# Patient Record
Sex: FEMALE | Race: OTHER | HISPANIC OR LATINO | Employment: FULL TIME | URBAN - METROPOLITAN AREA
[De-identification: names, ages, dates, MRNs, and addresses within clinical notes are randomized per-mention and may not be internally consistent; named-entity substitution may affect disease eponyms.]

---

## 2017-01-18 ENCOUNTER — APPOINTMENT (EMERGENCY)
Dept: ULTRASOUND IMAGING | Facility: HOSPITAL | Age: 22
End: 2017-01-18
Payer: COMMERCIAL

## 2017-01-18 ENCOUNTER — HOSPITAL ENCOUNTER (EMERGENCY)
Facility: HOSPITAL | Age: 22
Discharge: HOME | End: 2017-01-19
Attending: EMERGENCY MEDICINE | Admitting: EMERGENCY MEDICINE
Payer: COMMERCIAL

## 2017-01-18 VITALS
WEIGHT: 130 LBS | OXYGEN SATURATION: 100 % | DIASTOLIC BLOOD PRESSURE: 70 MMHG | SYSTOLIC BLOOD PRESSURE: 103 MMHG | TEMPERATURE: 98.4 F | RESPIRATION RATE: 16 BRPM | HEART RATE: 82 BPM

## 2017-01-18 DIAGNOSIS — O03.9 MISCARRIAGE: ICD-10-CM

## 2017-01-18 DIAGNOSIS — O20.9 FIRST TRIMESTER BLEEDING: Primary | ICD-10-CM

## 2017-01-18 DIAGNOSIS — N93.9 VAGINAL BLEEDING, ABNORMAL: ICD-10-CM

## 2017-01-18 LAB
ABO GROUP BLD: NORMAL
ALBUMIN SERPL BCP-MCNC: 3.8 G/DL (ref 3.5–5)
ALP SERPL-CCNC: 44 U/L (ref 46–116)
ALT SERPL W P-5'-P-CCNC: 18 U/L (ref 12–78)
ANION GAP SERPL CALCULATED.3IONS-SCNC: 5 MMOL/L (ref 4–13)
AST SERPL W P-5'-P-CCNC: 17 U/L (ref 5–45)
B-HCG SERPL-ACNC: ABNORMAL MIU/ML
BACTERIA UR QL AUTO: ABNORMAL /HPF
BASOPHILS # BLD AUTO: 0.01 THOUSANDS/ΜL (ref 0–0.1)
BASOPHILS NFR BLD AUTO: 0 % (ref 0–1)
BILIRUB SERPL-MCNC: 0.43 MG/DL (ref 0.2–1)
BILIRUB UR QL STRIP: ABNORMAL
BUN SERPL-MCNC: 9 MG/DL (ref 5–25)
CALCIUM SERPL-MCNC: 9 MG/DL (ref 8.3–10.1)
CHLORIDE SERPL-SCNC: 104 MMOL/L (ref 100–108)
CLARITY UR: CLEAR
CO2 SERPL-SCNC: 29 MMOL/L (ref 21–32)
COLOR UR: YELLOW
CREAT SERPL-MCNC: 0.59 MG/DL (ref 0.6–1.3)
EOSINOPHIL # BLD AUTO: 0.08 THOUSAND/ΜL (ref 0–0.61)
EOSINOPHIL NFR BLD AUTO: 1 % (ref 0–6)
ERYTHROCYTE [DISTWIDTH] IN BLOOD BY AUTOMATED COUNT: 12.2 % (ref 11.6–15.1)
GFR SERPL CREATININE-BSD FRML MDRD: >60 ML/MIN/1.73SQ M
GLUCOSE SERPL-MCNC: 89 MG/DL (ref 65–140)
GLUCOSE UR STRIP-MCNC: NEGATIVE MG/DL
HCG UR QL: POSITIVE
HCT VFR BLD AUTO: 39 % (ref 34.8–46.1)
HGB BLD-MCNC: 13.2 G/DL (ref 11.5–15.4)
HGB UR QL STRIP.AUTO: NEGATIVE
KETONES UR STRIP-MCNC: ABNORMAL MG/DL
LEUKOCYTE ESTERASE UR QL STRIP: ABNORMAL
LYMPHOCYTES # BLD AUTO: 2.2 THOUSANDS/ΜL (ref 0.6–4.47)
LYMPHOCYTES NFR BLD AUTO: 38 % (ref 14–44)
MCH RBC QN AUTO: 31.7 PG (ref 26.8–34.3)
MCHC RBC AUTO-ENTMCNC: 33.8 G/DL (ref 31.4–37.4)
MCV RBC AUTO: 94 FL (ref 82–98)
MONOCYTES # BLD AUTO: 0.47 THOUSAND/ΜL (ref 0.17–1.22)
MONOCYTES NFR BLD AUTO: 8 % (ref 4–12)
MUCOUS THREADS UR QL AUTO: ABNORMAL
NEUTROPHILS # BLD AUTO: 3.1 THOUSANDS/ΜL (ref 1.85–7.62)
NEUTS SEG NFR BLD AUTO: 53 % (ref 43–75)
NITRITE UR QL STRIP: NEGATIVE
NON-SQ EPI CELLS URNS QL MICRO: ABNORMAL /HPF
NRBC BLD AUTO-RTO: 0 /100 WBCS
OTHER STN SPEC: ABNORMAL
PH UR STRIP.AUTO: 6 [PH] (ref 4.5–8)
PLATELET # BLD AUTO: 236 THOUSANDS/UL (ref 149–390)
PMV BLD AUTO: 10.4 FL (ref 8.9–12.7)
POTASSIUM SERPL-SCNC: 3.9 MMOL/L (ref 3.5–5.3)
PROT SERPL-MCNC: 7.2 G/DL (ref 6.4–8.2)
PROT UR STRIP-MCNC: NEGATIVE MG/DL
RBC # BLD AUTO: 4.17 MILLION/UL (ref 3.81–5.12)
RBC #/AREA URNS AUTO: ABNORMAL /HPF
RH BLD: NEGATIVE
SODIUM SERPL-SCNC: 138 MMOL/L (ref 136–145)
SP GR UR STRIP.AUTO: >=1.03 (ref 1–1.03)
UROBILINOGEN UR QL STRIP.AUTO: 2 E.U./DL
WBC # BLD AUTO: 5.86 THOUSAND/UL (ref 4.31–10.16)
WBC #/AREA URNS AUTO: ABNORMAL /HPF

## 2017-01-18 PROCEDURE — 85025 COMPLETE CBC W/AUTO DIFF WBC: CPT | Performed by: EMERGENCY MEDICINE

## 2017-01-18 PROCEDURE — 84702 CHORIONIC GONADOTROPIN TEST: CPT | Performed by: EMERGENCY MEDICINE

## 2017-01-18 PROCEDURE — 87086 URINE CULTURE/COLONY COUNT: CPT

## 2017-01-18 PROCEDURE — 81002 URINALYSIS NONAUTO W/O SCOPE: CPT | Performed by: EMERGENCY MEDICINE

## 2017-01-18 PROCEDURE — 86901 BLOOD TYPING SEROLOGIC RH(D): CPT | Performed by: EMERGENCY MEDICINE

## 2017-01-18 PROCEDURE — 81025 URINE PREGNANCY TEST: CPT | Performed by: EMERGENCY MEDICINE

## 2017-01-18 PROCEDURE — 76815 OB US LIMITED FETUS(S): CPT

## 2017-01-18 PROCEDURE — 81001 URINALYSIS AUTO W/SCOPE: CPT

## 2017-01-18 PROCEDURE — 36415 COLL VENOUS BLD VENIPUNCTURE: CPT | Performed by: EMERGENCY MEDICINE

## 2017-01-18 PROCEDURE — 86900 BLOOD TYPING SEROLOGIC ABO: CPT | Performed by: EMERGENCY MEDICINE

## 2017-01-18 PROCEDURE — 80053 COMPREHEN METABOLIC PANEL: CPT | Performed by: EMERGENCY MEDICINE

## 2017-01-18 RX ORDER — ACETAMINOPHEN 325 MG/1
650 TABLET ORAL EVERY 6 HOURS PRN
Qty: 30 TABLET | Refills: 0 | Status: SHIPPED | OUTPATIENT
Start: 2017-01-18 | End: 2017-02-17

## 2017-01-19 PROCEDURE — 99284 EMERGENCY DEPT VISIT MOD MDM: CPT

## 2017-01-19 PROCEDURE — 96372 THER/PROPH/DIAG INJ SC/IM: CPT

## 2017-01-19 RX ADMIN — HUMAN RHO(D) IMMUNE GLOBULIN 300 MCG: 300 INJECTION, SOLUTION INTRAMUSCULAR at 00:13

## 2017-01-20 LAB — BACTERIA UR CULT: NORMAL

## 2017-12-19 ENCOUNTER — HOSPITAL ENCOUNTER (EMERGENCY)
Facility: HOSPITAL | Age: 22
Discharge: HOME | End: 2017-12-19
Attending: EMERGENCY MEDICINE | Admitting: EMERGENCY MEDICINE
Payer: COMMERCIAL

## 2017-12-19 VITALS
WEIGHT: 152 LBS | DIASTOLIC BLOOD PRESSURE: 60 MMHG | SYSTOLIC BLOOD PRESSURE: 107 MMHG | HEART RATE: 106 BPM | RESPIRATION RATE: 18 BRPM | OXYGEN SATURATION: 95 % | TEMPERATURE: 98.7 F

## 2017-12-19 DIAGNOSIS — R11.2 NAUSEA, VOMITING, AND DIARRHEA: ICD-10-CM

## 2017-12-19 DIAGNOSIS — R19.7 NAUSEA, VOMITING, AND DIARRHEA: ICD-10-CM

## 2017-12-19 DIAGNOSIS — R10.13 ACUTE EPIGASTRIC PAIN: Primary | ICD-10-CM

## 2017-12-19 LAB
AMORPH URATE CRY URNS QL MICRO: ABNORMAL /HPF
ANION GAP BLD CALC-SCNC: 18 MMOL/L (ref 4–13)
BACTERIA UR QL AUTO: ABNORMAL /HPF
BILIRUB UR QL STRIP: ABNORMAL
BUN BLD-MCNC: 14 MG/DL (ref 5–25)
CA-I BLD-SCNC: 1.13 MMOL/L (ref 1.12–1.32)
CHLORIDE BLD-SCNC: 103 MMOL/L (ref 100–108)
CLARITY UR: ABNORMAL
COLOR UR: YELLOW
CREAT BLD-MCNC: 0.7 MG/DL (ref 0.6–1.3)
EXT PREG TEST URINE: NEGATIVE
GFR SERPL CREATININE-BSD FRML MDRD: 122 ML/MIN/1.73SQ M
GLUCOSE SERPL-MCNC: 106 MG/DL (ref 65–140)
GLUCOSE UR STRIP-MCNC: NEGATIVE MG/DL
HCT VFR BLD CALC: 45 % (ref 34.8–46.1)
HGB BLDA-MCNC: 15.3 G/DL (ref 11.5–15.4)
HGB UR QL STRIP.AUTO: ABNORMAL
KETONES UR STRIP-MCNC: ABNORMAL MG/DL
LEUKOCYTE ESTERASE UR QL STRIP: ABNORMAL
NITRITE UR QL STRIP: NEGATIVE
NON-SQ EPI CELLS URNS QL MICRO: ABNORMAL /HPF
PCO2 BLD: 23 MMOL/L (ref 21–32)
PH UR STRIP.AUTO: 5 [PH] (ref 4.5–8)
POTASSIUM BLD-SCNC: 3.7 MMOL/L (ref 3.5–5.3)
PROT UR STRIP-MCNC: ABNORMAL MG/DL
RBC #/AREA URNS AUTO: ABNORMAL /HPF
SODIUM BLD-SCNC: 140 MMOL/L (ref 136–145)
SP GR UR STRIP.AUTO: >=1.03 (ref 1–1.03)
SPECIMEN SOURCE: ABNORMAL
UROBILINOGEN UR QL STRIP.AUTO: 1 E.U./DL
WBC #/AREA URNS AUTO: ABNORMAL /HPF

## 2017-12-19 PROCEDURE — 96375 TX/PRO/DX INJ NEW DRUG ADDON: CPT

## 2017-12-19 PROCEDURE — 81002 URINALYSIS NONAUTO W/O SCOPE: CPT | Performed by: EMERGENCY MEDICINE

## 2017-12-19 PROCEDURE — 81001 URINALYSIS AUTO W/SCOPE: CPT

## 2017-12-19 PROCEDURE — 81025 URINE PREGNANCY TEST: CPT | Performed by: EMERGENCY MEDICINE

## 2017-12-19 PROCEDURE — 96361 HYDRATE IV INFUSION ADD-ON: CPT

## 2017-12-19 PROCEDURE — 80047 BASIC METABLC PNL IONIZED CA: CPT

## 2017-12-19 PROCEDURE — 96374 THER/PROPH/DIAG INJ IV PUSH: CPT

## 2017-12-19 PROCEDURE — 99284 EMERGENCY DEPT VISIT MOD MDM: CPT

## 2017-12-19 PROCEDURE — 85014 HEMATOCRIT: CPT

## 2017-12-19 RX ORDER — ONDANSETRON 2 MG/ML
4 INJECTION INTRAMUSCULAR; INTRAVENOUS ONCE
Status: COMPLETED | OUTPATIENT
Start: 2017-12-19 | End: 2017-12-19

## 2017-12-19 RX ORDER — ONDANSETRON 4 MG/1
4 TABLET, FILM COATED ORAL EVERY 6 HOURS
Qty: 6 TABLET | Refills: 0 | Status: ON HOLD | OUTPATIENT
Start: 2017-12-19 | End: 2018-08-06

## 2017-12-19 RX ORDER — DICYCLOMINE HCL 20 MG
20 TABLET ORAL 2 TIMES DAILY
Qty: 8 TABLET | Refills: 0 | Status: ON HOLD | OUTPATIENT
Start: 2017-12-19 | End: 2018-08-06

## 2017-12-19 RX ORDER — KETOROLAC TROMETHAMINE 30 MG/ML
15 INJECTION, SOLUTION INTRAMUSCULAR; INTRAVENOUS ONCE
Status: COMPLETED | OUTPATIENT
Start: 2017-12-19 | End: 2017-12-19

## 2017-12-19 RX ORDER — MAGNESIUM HYDROXIDE/ALUMINUM HYDROXICE/SIMETHICONE 120; 1200; 1200 MG/30ML; MG/30ML; MG/30ML
30 SUSPENSION ORAL ONCE
Status: COMPLETED | OUTPATIENT
Start: 2017-12-19 | End: 2017-12-19

## 2017-12-19 RX ADMIN — ALUMINUM HYDROXIDE, MAGNESIUM HYDROXIDE, AND SIMETHICONE 30 ML: 200; 200; 20 SUSPENSION ORAL at 22:33

## 2017-12-19 RX ADMIN — ONDANSETRON 4 MG: 2 INJECTION INTRAMUSCULAR; INTRAVENOUS at 22:32

## 2017-12-19 RX ADMIN — LIDOCAINE HYDROCHLORIDE 15 ML: 20 SOLUTION ORAL; TOPICAL at 22:33

## 2017-12-19 RX ADMIN — KETOROLAC TROMETHAMINE 15 MG: 30 INJECTION, SOLUTION INTRAMUSCULAR at 22:33

## 2017-12-19 RX ADMIN — SODIUM CHLORIDE 1000 ML: 0.9 INJECTION, SOLUTION INTRAVENOUS at 22:31

## 2017-12-20 NOTE — DISCHARGE INSTRUCTIONS
Gastroenteritis   LO QUE NECESITA SABER:   La gastroenteritis, o gripe estomacal, es felix infección del estómago y los intestinos  INSTRUCCIONES SOBRE [facility]:   Llame al 911 en zachariah de presentar lo siguiente:   · Usted tiene dificultad para respirar o pulso acelerado  Regrese a la linda de emergencias si:   · Usted ve dawna en perez diarrea  · Usted no puede dejar de vomitar  · Usted no ha orinado en 12 horas  · Usted siente que se va a desmayar  Pregúntele a perez [place] vitaminas y minerales son adecuados para usted  · Usted tiene fiebre  · Usted continúa con vómitos o diarrea aún después del tratamiento  · Usted ve lombrices en perez diarrea  · Perez boca u ojos están secos  Usted no está orinando tanto o con la misma frecuencia  · Usted tiene preguntas o inquietudes acerca de perez condición o cuidado  Medicamentos:   · Medicamentos,  se pueden administrar para [employer] vómitos o la diarrea, disminuir los calambres abdominales o tratar felix infección  · [place] karmen medicamentos sanna se le haya indicado  Consulte con perez médico si usted nhan que perez medicamento no le está ayudando o si presenta efectos secundarios  Infórmele si es alérgico a cualquier medicamento  Mantenga felix lista actualizada de los [employer], las vitaminas y los productos herbales que thanh  Incluya los siguientes datos de los medicamentos: cantidad, frecuencia y motivo de administración  Traiga con usted la lista o los envases de la píldoras a karmen citas de seguimiento  Lleve la lista de los medicamentos con usted en zachariah de felix emergencia  [place] de perez síntomas:   · [place] líquidos sanna se le haya indicado  Pregunte a perez médico qué cantidad de líquido debe beber a diario y qué líquidos le recomienda  Es posible que también necesite matt felix solución de rehidratación oral (SRO)   Felix SRO contiene la cantidad [place] de azúcar, sal y minerales en agua para reponer los líquidos corporales  · Consuma alimentos blandos  Cuando usted sienta [place], empiece a comer alimentos suaves y blandos  Ejemplos son los plátanos, sopas claras, yi y puré de [place]  No consuma productos lácteos, alcohol, bebidas azucaradas, o bebidas con cafeína hasta que se sienta mejor  · Descanse el mayor tiempo posible  Cuando se empiece a sentir mejor, comience poco a poco a hacer más cada día  Evite la propagación de la gastroenteritis:  La gastroenteritis se puede propagar fácilmente  Manténgase usted, perez demetrio y karmen alrededores limpios para ayudar a evitar la propagación de la gastroenteritis:  · Lávese las pooja frecuentemente  Utilice agua y [employer] las pooja después de usar el baño, cambiarle el pañal a un lev o estornudar  Lávese las pooja antes de comer o preparar alimentos  · Limpie las superficies y lave la ropa con frecuencia  Lave perez ropa y karmen toallas por separado del krishna de la ropa  Limpie las superficies de perez hogar con limpiador antibacterial o con blanqueador  · Lave y cocine giacomo los alimentos  Lave las verduras crudas antes de cocinar  [address] y [place]  No utilice los mismos platos para las belkis crudas que para otros alimentos  Ponga en el refrigerador inmediatamente cualquier alimento que haya sobrado  · Esté alerta cuando usted vaya de campamento o cuando viaje  Solamente tome agua limpia  No tome agua de los riky o amita a menos que usted purifique o hierva el agua suhail  Cuando viaje, tome agua embotellada y no le ponga hielo  No coma fruta con la cáscara  No coma pescado crudo o belkis que no están cocinadas completamente  Acuda a karmen consultas de control con perez médico según le indicaron  Anote karmen preguntas para que se acuerde de hacerlas jarred karmen visitas  © 2017 [facility] Information is for End User's use only and may not be sold, redistributed or otherwise used for commercial purposes   All illustrations and images included in CareNotes® are the copyrighted property of A D A M , Inc  or Trevin Ndiaye  Esta información es sólo para uso en educación  Perez intención no es darle un consejo médico sobre enfermedades o tratamientos  Colsulte con perez Rafaela Avila farmacéutico antes de seguir cualquier régimen médico para saber si es seguro y efectivo para usted

## 2017-12-20 NOTE — ED PROVIDER NOTES
History  Chief Complaint   Patient presents with    Abdominal Pain     Pt states middle quad abd pain that started today, vomiting and diarrhea       History provided by:  Patient  Abdominal Pain   Pain location:  Epigastric  Pain quality: burning    Pain radiates to:  Does not radiate  Timing:  Constant  Progression:  Unchanged  Chronicity:  New  Context: not previous surgeries and not sick contacts    Relieved by:  Nothing  Worsened by:  Nothing  Ineffective treatments:  None tried  Associated symptoms: diarrhea, nausea and vomiting    Associated symptoms: no chills, no constipation, no cough, no dysuria, no fever, no hematuria, no vaginal bleeding and no vaginal discharge    Risk factors: has not had multiple surgeries        None       History reviewed  No pertinent past medical history  History reviewed  No pertinent surgical history  History reviewed  No pertinent family history  I have reviewed and agree with the history as documented  Social History   Substance Use Topics    Smoking status: Never Smoker    Smokeless tobacco: Not on file    Alcohol use No        Review of Systems   Constitutional: Negative for appetite change, chills and fever  HENT: Negative for congestion and rhinorrhea  Respiratory: Negative for cough  Gastrointestinal: Positive for abdominal pain, diarrhea, nausea and vomiting  Negative for abdominal distention, blood in stool and constipation  Genitourinary: Negative for dysuria, flank pain, frequency, hematuria, urgency, vaginal bleeding and vaginal discharge  Musculoskeletal: Negative for back pain  All other systems reviewed and are negative        Physical Exam  ED Triage Vitals   Temperature Pulse Respirations Blood Pressure SpO2   12/19/17 2209 12/19/17 2209 12/19/17 2209 12/19/17 2209 12/19/17 2209   98 7 °F (37 1 °C) (!) 135 18 119/64 96 %      Temp Source Heart Rate Source Patient Position - Orthostatic VS BP Location FiO2 (%)   12/19/17 2209 12/19/17 2209 12/19/17 2311 12/19/17 2209 --   Temporal Monitor Lying Right arm       Pain Score       12/19/17 2233       8           Orthostatic Vital Signs  Vitals:    12/19/17 2209 12/19/17 2311   BP: 119/64 107/60   Pulse: (!) 135 (!) 106   Patient Position - Orthostatic VS:  Lying       Physical Exam   Constitutional: She is oriented to person, place, and time  She appears well-developed and well-nourished  No distress  HENT:   Head: Normocephalic  Mouth/Throat: Oropharynx is clear and moist and mucous membranes are normal    Neck: Normal range of motion  Neck supple  Cardiovascular: Regular rhythm, normal heart sounds and intact distal pulses  Tachycardia present  Exam reveals no gallop and no friction rub  No murmur heard  Pulmonary/Chest: Effort normal and breath sounds normal  No respiratory distress  She has no wheezes  She has no rales  Abdominal: Soft  Normal appearance and bowel sounds are normal  She exhibits no distension and no mass  There is no hepatosplenomegaly  There is tenderness in the epigastric area  There is no rigidity, no rebound, no guarding, no CVA tenderness, no tenderness at McBurney's point and negative Burgos's sign  Lymphadenopathy:     She has no cervical adenopathy  Neurological: She is alert and oriented to person, place, and time  Skin: Skin is warm, dry and intact  No rash noted  No pallor  Nursing note and vitals reviewed        ED Medications  Medications   sodium chloride 0 9 % bolus 1,000 mL (0 mL Intravenous Stopped 12/19/17 2335)   ondansetron (ZOFRAN) injection 4 mg (4 mg Intravenous Given 12/19/17 2232)   aluminum-magnesium hydroxide-simethicone (MYLANTA) 200-200-20 mg/5 mL oral suspension 30 mL (30 mL Oral Given 12/19/17 2233)   lidocaine viscous (XYLOCAINE) 2 % mucosal solution 15 mL (15 mL Swish & Swallow Given 12/19/17 2233)   ketorolac (TORADOL) injection 15 mg (15 mg Intravenous Given 12/19/17 2233)       Diagnostic Studies  Results Reviewed Procedure Component Value Units Date/Time    Urine Microscopic [[id number]]  (Abnormal) Collected:  12/19/17 2255    Lab Status:  Final result Specimen:  Urine from Urine, Clean Catch Updated:  12/19/17 2253     RBC, UA 0-1 (A) /hpf      WBC, UA 0-1 (A) /hpf      Epithelial Cells Moderate (A) /hpf      Bacteria, UA Occasional /hpf      AMORPH URATES Innumerable /hpf     POCT Chem 8+ [[id number]]  (Abnormal) Collected:  12/19/17 2238    Lab Status:  Final result Updated:  12/19/17 2243     SODIUM, I-STAT 140 mmol/l      Potassium, i-STAT 3 7 mmol/L      Chloride, istat 103 mmol/L      CO2, i-STAT 23 mmol/L      Anion Gap, Istat 18 (H) mmol/L      Calcium, Ionized i-STAT 1 13 mmol/L      BUN, I-STAT 14 mg/dl      Creatinine, i-STAT 0 7 mg/dl      eGFR 122 ml/min/1 73sq m      Glucose, i-STAT 106 mg/dl      Hct, i-STAT 45 %      Hgb, i-STAT 15 3 g/dl      Specimen Type VENOUS    POCT urinalysis dipstick [[id number]]  (Abnormal) Resulted:  12/19/17 2239    Lab Status:  Final result Specimen:  Urine Updated:  12/19/17 2239    ED Urine Macroscopic [[id number]]  (Abnormal) Collected:  12/19/17 2255    Lab Status:  Final result Specimen:  Urine Updated:  12/19/17 2238     Color, UA Yellow     Clarity, UA Turbid     pH, UA 5 0     Leukocytes, UA Small (A)     Nitrite, UA Negative     Protein, UA Trace (A) mg/dl      Glucose, UA Negative mg/dl      Ketones, UA 15 (1+) (A) mg/dl      Urobilinogen, UA 1 0 E U /dl      Bilirubin, UA Interference- unable to analyze (A)     Blood, UA Trace (A)     Specific Gravity, UA >=1 030    Narrative:       CLINITEK RESULT    POCT pregnancy, urine [[id number]]  (Normal) Resulted:  12/19/17 2232    Lab Status:  Final result Updated:  12/19/17 2232     EXT PREG TEST UR (Ref: Negative) negative                 No orders to display              Procedures  Procedures       Phone Contacts  ED Phone Contact    ED Course  ED Course as of Dec 19 2336   Tue Dec 19, 2017   2305 Pt states she feels better  MDM  Number of Diagnoses or Management Options  Diagnosis management comments: Epigastric pain/N/V/D - Will check istat to r/o lyte abnormality and give symptomatic tx  Amount and/or Complexity of Data Reviewed  Tests in the medicine section of CPT®: ordered and reviewed      CritCare Time    Disposition  Final diagnoses:   Acute epigastric pain   Nausea, vomiting, and diarrhea     Time reflects when diagnosis was documented in both MDM as applicable and the Disposition within this note     Time User Action Codes Description Comment    12/19/2017 11:05 PM Concha Javier [R10 13] Acute epigastric pain     12/19/2017 11:06 PM Israel Javier [employer] [R11 2,  R19 7] Nausea, vomiting, and diarrhea       ED Disposition     ED Disposition Condition Comment    Discharge  [address] discharge to home/self care  Condition at discharge: Good        Follow-up Information    None       Patient's Medications   Discharge Prescriptions    DICYCLOMINE (BENTYL) 20 MG TABLET    Take 1 tablet by mouth 2 (two) times a day       Start Date: 12/19/2017End Date: --       Order Dose: 20 mg       Quantity: 8 tablet    Refills: 0    ONDANSETRON (ZOFRAN) 4 MG TABLET    Take 1 tablet by mouth every 6 (six) hours       Start Date: 12/19/2017End Date: --       Order Dose: 4 mg       Quantity: 6 tablet    Refills: 0     No discharge procedures on file      ED Provider  Electronically Signed by           Delvin Lino 22, DO 12/19/17 2493

## 2018-08-06 ENCOUNTER — HOSPITAL ENCOUNTER (EMERGENCY)
Facility: HOSPITAL | Age: 23
Discharge: HOME | End: 2018-08-06
Admitting: OBSTETRICS & GYNECOLOGY
Payer: COMMERCIAL

## 2018-08-06 VITALS
HEART RATE: 81 BPM | TEMPERATURE: 98.5 F | RESPIRATION RATE: 18 BRPM | SYSTOLIC BLOOD PRESSURE: 112 MMHG | OXYGEN SATURATION: 100 % | DIASTOLIC BLOOD PRESSURE: 62 MMHG

## 2018-08-06 DIAGNOSIS — R10.13 EPIGASTRIC PAIN: Primary | ICD-10-CM

## 2018-08-06 PROBLEM — Z3A.19 19 WEEKS GESTATION OF PREGNANCY: Status: ACTIVE | Noted: 2018-08-06

## 2018-08-06 PROCEDURE — 99201 HB OFFICE/OUTPATIENT VISIT NEW (BRIEF): CPT

## 2018-08-06 RX ORDER — PANTOPRAZOLE SODIUM 40 MG/1
40 TABLET, DELAYED RELEASE ORAL ONCE
Status: COMPLETED | OUTPATIENT
Start: 2018-08-06 | End: 2018-08-06

## 2018-08-06 RX ORDER — MAGNESIUM HYDROXIDE/ALUMINUM HYDROXICE/SIMETHICONE 120; 1200; 1200 MG/30ML; MG/30ML; MG/30ML
30 SUSPENSION ORAL ONCE
Status: COMPLETED | OUTPATIENT
Start: 2018-08-06 | End: 2018-08-06

## 2018-08-06 RX ORDER — RANITIDINE 150 MG/1
150 TABLET ORAL 2 TIMES DAILY
Qty: 30 TABLET | Refills: 0 | Status: SHIPPED | OUTPATIENT
Start: 2018-08-06 | End: 2019-08-21

## 2018-08-06 RX ADMIN — PANTOPRAZOLE SODIUM 40 MG: 40 TABLET, DELAYED RELEASE ORAL at 05:43

## 2018-08-06 RX ADMIN — ALUMINUM HYDROXIDE, MAGNESIUM HYDROXIDE, AND SIMETHICONE 30 ML: 200; 200; 20 SUSPENSION ORAL at 05:38

## 2018-08-06 NOTE — PROGRESS NOTES
Triage Note - OB  Jhony Weller 20 y o  female MRN: [id number]  Unit/Bed#: L&D 324-01 Encounter: [id number]    Chief Complaint: Epigastric pain  DRAKE: Estimated Date of Delivery: 18    HPI: Patient is a  at 19w4d here with complaints of epigastric pain  She reports this pain began around 3:00 a m  and woke up from her sleep  She describes the pain as a burning pain does not radiate  The pain is not associated with fevers, chills, chest pain, shortness of breath, nausea, vomiting or diarrhea  She has tried to take Tums but it did not make her feel any better so she decided to come and get evaluated  Patient reports she ate fried seafood last night  Patient describes a longstanding history of gastritis  Patient denies any symptoms of contractions, lower abdominal pain, vaginal bleeding or loss of fluid  She reports regular fetal movement  She [facility]   She has an obstetric history that is significant for 2 missed ab, the last in 2017 was a complete molar pregnancy as confirmed by pathology for which she underwent a D&C  Vitals:   /62 (BP Location: Right arm)   Pulse 81   Temp 98 5 °F (36 9 °C) (Oral)   Resp 18   LMP 10/28/2017 (Exact Date)   SpO2 100%   There is no height or weight on file to calculate BMI  Physical Exam  GEN:  Appears common in no acute distress  ABD:  Soft, mild tenderness in the epigastric area, no rebound, no guarding, no Burgos sign, gravid nontender uterus palpated at the umbilicus  FHT:  150    TOCO:   Quiet    Lab, Imaging and other studies: I have personally reviewed pertinent reports  A/P:  20year old  with epigastric pain most likely representing gastritis  No signs of acute abdomen her Blima Hargrove sign suggestive of cholelithiasis  Patient with reported improvement following medications    1) Pantoprazole 40 mg p o  once  2) Mylanta  3) Patient requested prescription- Ranitidine 150mg PO BID given for 2 weeks  4) Discharge instructions given to patient and labor precautions reviewed  Importance with follow up to her doctor were discussed      D/W Dr Sterling Moreno MD  8/6/2018  5:31 AM

## 2018-08-06 NOTE — DISCHARGE INSTRUCTIONS
Gastritis   WHAT YOU NEED TO KNOW:   Gastritis is inflammation or irritation of the lining of your stomach  DISCHARGE INSTRUCTIONS:   Call 911 for any of the following:   · You develop chest pain or shortness of breath  Seek care immediately if:   · You vomit blood  · You have black or bloody bowel movements  · You have severe stomach or back pain  Contact your healthcare provider if:   · You have a fever  · You have new or worsening symptoms, even after treatment  · You have questions or concerns about your condition or care  Medicines:   · Medicines  may be given to help treat a bacterial infection or decrease stomach acid  · Take your medicine as directed  Contact your healthcare provider if you think your medicine is not helping or if you have side effects  Tell him or her if you are allergic to any medicine  Keep a list of the medicines, vitamins, and herbs you take  Include the amounts, and when and why you take them  Bring the list or the pill bottles to follow-up visits  Carry your medicine list with you in case of an emergency  Manage or prevent gastritis:   · Do not smoke  Nicotine and other chemicals in cigarettes and cigars can make your symptoms worse and cause lung damage  Ask your healthcare provider for information if you currently smoke and need help to quit  E-cigarettes or smokeless tobacco still contain nicotine  Talk to your healthcare provider before you use these products  · Do not drink alcohol  Alcohol can prevent healing and make your gastritis worse  Talk to your healthcare provider if you need help to stop drinking  · Do not take NSAIDs or aspirin unless directed  These and similar medicines can cause irritation  If your healthcare provider says it is okay to take NSAIDs, take them with food  · Do not eat foods that cause irritation  Foods such as oranges and salsa can cause burning or pain  Eat a variety of healthy foods   Examples include fruits (not citrus), vegetables, low-fat dairy products, beans, whole-grain breads, and lean meats and fish  Try to eat small meals, and drink water with your meals  Do not eat for at least 3 hours before you go to bed  · Find ways to relax and decrease stress  Stress can increase stomach acid and make gastritis worse  Activities such as yoga, meditation, or listening to music can help you relax  Spend time with friends, or do things you enjoy  Follow up with your healthcare provider as directed: You may need ongoing tests or treatment, or referral to a gastroenterologist  Write down your questions so you remember to ask them during your visits  © 2017 [address] Information is for End User's use only and may not be sold, redistributed or otherwise used for commercial purposes  All illustrations and images included in CareNotes® are the copyrighted property of [employer]  or Trevin Ndiaye  The above information is an  only  It is not intended as medical advice for individual conditions or treatments  Talk to your doctor, nurse or pharmacist before following any medical regimen to see if it is safe and effective for you

## 2019-03-05 ENCOUNTER — HOSPITAL ENCOUNTER (EMERGENCY)
Facility: HOSPITAL | Age: 24
Discharge: HOME | End: 2019-03-05
Attending: EMERGENCY MEDICINE | Admitting: EMERGENCY MEDICINE
Payer: COMMERCIAL

## 2019-03-05 VITALS
TEMPERATURE: 98.2 F | HEART RATE: 63 BPM | OXYGEN SATURATION: 99 % | SYSTOLIC BLOOD PRESSURE: 104 MMHG | WEIGHT: 158.73 LBS | RESPIRATION RATE: 12 BRPM | DIASTOLIC BLOOD PRESSURE: 67 MMHG

## 2019-03-05 DIAGNOSIS — R19.7 DIARRHEA: ICD-10-CM

## 2019-03-05 DIAGNOSIS — R11.10 VOMITING: Primary | ICD-10-CM

## 2019-03-05 LAB
ALBUMIN SERPL BCP-MCNC: 3.8 G/DL (ref 3.5–5)
ALP SERPL-CCNC: 77 U/L (ref 46–116)
ALT SERPL W P-5'-P-CCNC: 25 U/L (ref 12–78)
ANION GAP SERPL CALCULATED.3IONS-SCNC: 9 MMOL/L (ref 4–13)
AST SERPL W P-5'-P-CCNC: 21 U/L (ref 5–45)
BACTERIA UR QL AUTO: ABNORMAL /HPF
BASOPHILS # BLD AUTO: 0.02 THOUSANDS/ΜL (ref 0–0.1)
BASOPHILS NFR BLD AUTO: 0 % (ref 0–1)
BILIRUB SERPL-MCNC: 0.34 MG/DL (ref 0.2–1)
BILIRUB UR QL STRIP: NEGATIVE
BUN SERPL-MCNC: 15 MG/DL (ref 5–25)
CALCIUM SERPL-MCNC: 8.5 MG/DL (ref 8.3–10.1)
CHLORIDE SERPL-SCNC: 106 MMOL/L (ref 100–108)
CLARITY UR: CLEAR
CO2 SERPL-SCNC: 23 MMOL/L (ref 21–32)
COLOR UR: YELLOW
CREAT SERPL-MCNC: 0.73 MG/DL (ref 0.6–1.3)
EOSINOPHIL # BLD AUTO: 0.02 THOUSAND/ΜL (ref 0–0.61)
EOSINOPHIL NFR BLD AUTO: 0 % (ref 0–6)
ERYTHROCYTE [DISTWIDTH] IN BLOOD BY AUTOMATED COUNT: 12.5 % (ref 11.6–15.1)
EXT PREG TEST URINE: NORMAL
GFR SERPL CREATININE-BSD FRML MDRD: 114 ML/MIN/1.73SQ M
GLUCOSE SERPL-MCNC: 99 MG/DL (ref 65–140)
GLUCOSE UR STRIP-MCNC: NEGATIVE MG/DL
HCT VFR BLD AUTO: 40.5 % (ref 34.8–46.1)
HGB BLD-MCNC: 12.9 G/DL (ref 11.5–15.4)
HGB UR QL STRIP.AUTO: ABNORMAL
IMM GRANULOCYTES # BLD AUTO: 0.02 THOUSAND/UL (ref 0–0.2)
IMM GRANULOCYTES NFR BLD AUTO: 0 % (ref 0–2)
KETONES UR STRIP-MCNC: NEGATIVE MG/DL
LEUKOCYTE ESTERASE UR QL STRIP: NEGATIVE
LIPASE SERPL-CCNC: 93 U/L (ref 73–393)
LYMPHOCYTES # BLD AUTO: 1.1 THOUSANDS/ΜL (ref 0.6–4.47)
LYMPHOCYTES NFR BLD AUTO: 11 % (ref 14–44)
MCH RBC QN AUTO: 29.9 PG (ref 26.8–34.3)
MCHC RBC AUTO-ENTMCNC: 31.9 G/DL (ref 31.4–37.4)
MCV RBC AUTO: 94 FL (ref 82–98)
MONOCYTES # BLD AUTO: 0.27 THOUSAND/ΜL (ref 0.17–1.22)
MONOCYTES NFR BLD AUTO: 3 % (ref 4–12)
NEUTROPHILS # BLD AUTO: 8.3 THOUSANDS/ΜL (ref 1.85–7.62)
NEUTS SEG NFR BLD AUTO: 86 % (ref 43–75)
NITRITE UR QL STRIP: NEGATIVE
NON-SQ EPI CELLS URNS QL MICRO: ABNORMAL /HPF
NRBC BLD AUTO-RTO: 0 /100 WBCS
PH UR STRIP.AUTO: 5.5 [PH]
PLATELET # BLD AUTO: 275 THOUSANDS/UL (ref 149–390)
PMV BLD AUTO: 9.7 FL (ref 8.9–12.7)
POTASSIUM SERPL-SCNC: 4.1 MMOL/L (ref 3.5–5.3)
PROT SERPL-MCNC: 7.5 G/DL (ref 6.4–8.2)
PROT UR STRIP-MCNC: NEGATIVE MG/DL
RBC # BLD AUTO: 4.31 MILLION/UL (ref 3.81–5.12)
RBC #/AREA URNS AUTO: ABNORMAL /HPF
SODIUM SERPL-SCNC: 138 MMOL/L (ref 136–145)
SP GR UR STRIP.AUTO: >=1.03 (ref 1–1.03)
UROBILINOGEN UR QL STRIP.AUTO: 0.2 E.U./DL
WBC # BLD AUTO: 9.73 THOUSAND/UL (ref 4.31–10.16)
WBC #/AREA URNS AUTO: ABNORMAL /HPF

## 2019-03-05 PROCEDURE — 83690 ASSAY OF LIPASE: CPT | Performed by: PHYSICIAN ASSISTANT

## 2019-03-05 PROCEDURE — 80053 COMPREHEN METABOLIC PANEL: CPT | Performed by: PHYSICIAN ASSISTANT

## 2019-03-05 PROCEDURE — 99284 EMERGENCY DEPT VISIT MOD MDM: CPT

## 2019-03-05 PROCEDURE — 96361 HYDRATE IV INFUSION ADD-ON: CPT

## 2019-03-05 PROCEDURE — 81001 URINALYSIS AUTO W/SCOPE: CPT | Performed by: PHYSICIAN ASSISTANT

## 2019-03-05 PROCEDURE — 36415 COLL VENOUS BLD VENIPUNCTURE: CPT | Performed by: PHYSICIAN ASSISTANT

## 2019-03-05 PROCEDURE — 96374 THER/PROPH/DIAG INJ IV PUSH: CPT

## 2019-03-05 PROCEDURE — 81025 URINE PREGNANCY TEST: CPT | Performed by: PHYSICIAN ASSISTANT

## 2019-03-05 PROCEDURE — 85025 COMPLETE CBC W/AUTO DIFF WBC: CPT | Performed by: PHYSICIAN ASSISTANT

## 2019-03-05 RX ORDER — ONDANSETRON 2 MG/ML
4 INJECTION INTRAMUSCULAR; INTRAVENOUS ONCE
Status: COMPLETED | OUTPATIENT
Start: 2019-03-05 | End: 2019-03-05

## 2019-03-05 RX ORDER — ONDANSETRON 4 MG/1
4 TABLET, FILM COATED ORAL EVERY 8 HOURS PRN
Qty: 10 TABLET | Refills: 0 | Status: SHIPPED | OUTPATIENT
Start: 2019-03-05 | End: 2019-08-21

## 2019-03-05 RX ADMIN — ONDANSETRON 4 MG: 2 INJECTION INTRAMUSCULAR; INTRAVENOUS at 09:19

## 2019-03-05 RX ADMIN — SODIUM CHLORIDE 1000 ML: 0.9 INJECTION, SOLUTION INTRAVENOUS at 09:18

## 2019-03-05 NOTE — ED PROVIDER NOTES
History  Chief Complaint   Patient presents with    Vomiting     vomiting and diarrhea, generalized abd cramping since 0400 no ill contacts    Diarrhea     This is a 24-year-old female patient woke up at 4 o'clock this morning and had some vomiting and diarrhea without blood  She is able to keep down sips of water but she cannot keep down food  She appears nontoxic in no acute distress nothing makes it better food makes it worse  Fever no chills no headache blurred vision double vision cough congestion sore throat   No chest pain or shortness of breath no abdominal pain no urgency frequency or dysuria  Differential diagnosis includes not limited to viral versus bacterial gastroenteritis, pregnancy, food poisoning less likely          Prior to Admission Medications   Prescriptions Last Dose Informant Patient Reported? Taking? Prenatal MV-Min-Fe Fum-FA-DHA (PRENATAL 1 PO) Not Taking at Unknown time Self Yes No   Sig: Take 1 tablet by mouth daily   ranitidine (ZANTAC) 150 mg tablet Not Taking at Unknown time  No No   Sig: Take 1 tablet (150 mg total) by mouth 2 (two) times a day   Patient not taking: Reported on 3/5/2019      Facility-Administered Medications: None       History reviewed  No pertinent past medical history  History reviewed  No pertinent surgical history  History reviewed  No pertinent family history  I have reviewed and agree with the history as documented  Social History     Tobacco Use    Smoking status: Never Smoker    Smokeless tobacco: Never Used   Substance Use Topics    Alcohol use: No    Drug use: No        Review of Systems   All other systems reviewed and are negative  Physical Exam  Physical Exam   Constitutional: She appears well-developed and well-nourished  HENT:   Head: Normocephalic and atraumatic     Right Ear: External ear normal    Left Ear: External ear normal    Nose: Nose normal    Mouth/Throat: Oropharynx is clear and moist    Eyes: Pupils are equal, round, and reactive to light  Conjunctivae are normal    Neck: Normal range of motion  Neck supple  Cardiovascular: Normal rate and regular rhythm  Pulmonary/Chest: Effort normal and breath sounds normal    Abdominal: Soft  Bowel sounds are normal  There is no tenderness  Neurological: She is alert  Skin: Skin is warm  Psychiatric: She has a normal mood and affect  Her behavior is normal    Nursing note and vitals reviewed  Vital Signs  ED Triage Vitals [03/05/19 0847]   Temperature Pulse Respirations Blood Pressure SpO2   98 2 °F (36 8 °C) 84 14 131/73 98 %      Temp Source Heart Rate Source Patient Position - Orthostatic VS BP Location FiO2 (%)   Oral Monitor -- -- --      Pain Score       7           Vitals:    03/05/19 0847 03/05/19 0952   BP: 131/73 104/67   Pulse: 84 63       Visual Acuity      ED Medications  Medications   sodium chloride 0 9 % bolus 1,000 mL (0 mL Intravenous Stopped 3/5/19 0952)   ondansetron (ZOFRAN) injection 4 mg (4 mg Intravenous Given 3/5/19 0919)       Diagnostic Studies  Results Reviewed     Procedure Component Value Units Date/Time    Comprehensive metabolic panel [[id number]] Collected:  03/05/19 0829    Lab Status:  Final result Specimen:  Blood from Arm, Right Updated:  03/05/19 6585     Sodium 138 mmol/L      Potassium 4 1 mmol/L      Chloride 106 mmol/L      CO2 23 mmol/L      ANION GAP 9 mmol/L      BUN 15 mg/dL      Creatinine 0 73 mg/dL      Glucose 99 mg/dL      Calcium 8 5 mg/dL      AST 21 U/L      ALT 25 U/L      Alkaline Phosphatase 77 U/L      Total Protein 7 5 g/dL      Albumin 3 8 g/dL      Total Bilirubin 0 34 mg/dL      eGFR 114 ml/min/1 73sq m     Narrative:       National Kidney Disease Education Program recommendations are as follows:  GFR calculation is accurate only with a steady state creatinine  Chronic Kidney disease less than 60 ml/min/1 73 sq  meters  Kidney failure less than 15 ml/min/1 73 sq  meters      Lipase [[id number]]  (Normal) Collected:  03/05/19 0918    Lab Status:  Final result Specimen:  Blood from Arm, Right Updated:  03/05/19 0942     Lipase 93 u/L     CBC and differential [[id number]]  (Abnormal) Collected:  03/05/19 0918    Lab Status:  Final result Specimen:  Blood from Arm, Right Updated:  03/05/19 0929     WBC 9 73 Thousand/uL      RBC 4 31 Million/uL      Hemoglobin 12 9 g/dL      Hematocrit 40 5 %      MCV 94 fL      MCH 29 9 pg      MCHC 31 9 g/dL      RDW 12 5 %      MPV 9 7 fL      Platelets 919 Thousands/uL      nRBC 0 /100 WBCs      Neutrophils Relative 86 %      Immat GRANS % 0 %      Lymphocytes Relative 11 %      Monocytes Relative 3 %      Eosinophils Relative 0 %      Basophils Relative 0 %      Neutrophils Absolute 8 30 Thousands/µL      Immature Grans Absolute 0 02 Thousand/uL      Lymphocytes Absolute 1 10 Thousands/µL      Monocytes Absolute 0 27 Thousand/µL      Eosinophils Absolute 0 02 Thousand/µL      Basophils Absolute 0 02 Thousands/µL     Urine Microscopic [[id number]]  (Abnormal) Collected:  03/05/19 0902    Lab Status:  Final result Specimen:  Urine, Clean Catch Updated:  03/05/19 0920     RBC, UA None Seen /hpf      WBC, UA None Seen /hpf      Epithelial Cells Occasional /hpf      Bacteria, UA Innumerable /hpf     UA w Reflex to Microscopic [[id number]]  (Abnormal) Collected:  03/05/19 0902    Lab Status:  Final result Specimen:  Urine, Clean Catch Updated:  03/05/19 0911     Color, UA Yellow     Clarity, UA Clear     Specific Gravity, UA >=1 030     pH, UA 5 5     Leukocytes, UA Negative     Nitrite, UA Negative     Protein, UA Negative mg/dl      Glucose, UA Negative mg/dl      Ketones, UA Negative mg/dl      Urobilinogen, UA 0 2 E U /dl      Bilirubin, UA Negative     Blood, UA Trace-Intact    POCT pregnancy, urine [[id number]]  (Normal) Resulted:  03/05/19 0906    Lab Status:  Final result Updated:  03/05/19 0906     EXT PREG TEST UR (Ref: Negative) NEG ( - )                 No orders to display Procedures  Procedures       Phone Contacts  ED Phone Contact    ED Course  ED Course as of Mar 05 1012   Tue Mar 05, 2019   1009 This is a 61-year-old female patient who presented with nausea and vomiting patient was given Zofran fluids and now is tolerating fluids well orally she feels better all labs reviewed no gross abnormalities she would like to go home when she is stable for discharge                                  MDM    Disposition  Final diagnoses:   Vomiting   Diarrhea     Time reflects when diagnosis was documented in both MDM as applicable and the Disposition within this note     Time User Action Codes Description Comment    3/5/2019 10:10 AM Kyle Staton Add [R11 10] Vomiting     3/5/2019 10:10 AM Kyle Staton Add [R19 7] Diarrhea       ED Disposition     ED Disposition Condition Date/Time Comment    Discharge Stable Tue Mar 5, 2019 10:10 AM Clarice Dominique discharge to home/self care  Follow-up Information     Follow up With Specialties Details Why [address] Family Medicine Schedule an appointment as soon as possible for a visit   [address] [place] [address] Drive [address] [phone]            Patient's Medications   Discharge Prescriptions    ONDANSETRON (ZOFRAN) 4 MG TABLET    Take 1 tablet (4 mg total) by mouth every 8 (eight) hours as needed for nausea or vomiting for up to 3 days       Start Date: 3/5/2019  End Date: 3/8/2019       Order Dose: 4 mg       Quantity: 10 tablet    Refills: 0     No discharge procedures on file      ED Provider  Electronically Signed by           Liliya Gusman PA-C  03/05/19 1012

## 2019-08-21 ENCOUNTER — APPOINTMENT (EMERGENCY)
Dept: CT IMAGING | Facility: HOSPITAL | Age: 24
End: 2019-08-21
Payer: COMMERCIAL

## 2019-08-21 ENCOUNTER — HOSPITAL ENCOUNTER (EMERGENCY)
Facility: HOSPITAL | Age: 24
Discharge: HOME | End: 2019-08-21
Attending: EMERGENCY MEDICINE
Payer: COMMERCIAL

## 2019-08-21 VITALS
RESPIRATION RATE: 16 BRPM | TEMPERATURE: 98.2 F | WEIGHT: 148.81 LBS | DIASTOLIC BLOOD PRESSURE: 63 MMHG | SYSTOLIC BLOOD PRESSURE: 111 MMHG | OXYGEN SATURATION: 100 % | HEART RATE: 51 BPM

## 2019-08-21 DIAGNOSIS — R19.7 DIARRHEA: ICD-10-CM

## 2019-08-21 DIAGNOSIS — N39.0 UTI (URINARY TRACT INFECTION): Primary | ICD-10-CM

## 2019-08-21 DIAGNOSIS — R10.31 RLQ ABDOMINAL PAIN: ICD-10-CM

## 2019-08-21 LAB
ALBUMIN SERPL BCP-MCNC: 3.9 G/DL (ref 3.5–5)
ALP SERPL-CCNC: 59 U/L (ref 46–116)
ALT SERPL W P-5'-P-CCNC: 20 U/L (ref 12–78)
ANION GAP SERPL CALCULATED.3IONS-SCNC: 8 MMOL/L (ref 4–13)
AST SERPL W P-5'-P-CCNC: 17 U/L (ref 5–45)
BACTERIA UR QL AUTO: ABNORMAL /HPF
BASOPHILS # BLD AUTO: 0.02 THOUSANDS/ΜL (ref 0–0.1)
BASOPHILS NFR BLD AUTO: 1 % (ref 0–1)
BILIRUB SERPL-MCNC: 0.54 MG/DL (ref 0.2–1)
BILIRUB UR QL STRIP: NEGATIVE
BILIRUB UR QL STRIP: NEGATIVE
BUN SERPL-MCNC: 10 MG/DL (ref 5–25)
C TRACH DNA SPEC QL NAA+PROBE: NEGATIVE
CALCIUM SERPL-MCNC: 8.9 MG/DL (ref 8.3–10.1)
CHLORIDE SERPL-SCNC: 106 MMOL/L (ref 100–108)
CLARITY UR: ABNORMAL
CLARITY UR: CLEAR
CLARITY, POC: ABNORMAL
CO2 SERPL-SCNC: 25 MMOL/L (ref 21–32)
COLOR UR: YELLOW
COLOR UR: YELLOW
COLOR, POC: YELLOW
CREAT SERPL-MCNC: 0.74 MG/DL (ref 0.6–1.3)
EOSINOPHIL # BLD AUTO: 0.07 THOUSAND/ΜL (ref 0–0.61)
EOSINOPHIL NFR BLD AUTO: 2 % (ref 0–6)
ERYTHROCYTE [DISTWIDTH] IN BLOOD BY AUTOMATED COUNT: 12.5 % (ref 11.6–15.1)
EXT PREG TEST URINE: NEGATIVE
EXT. CONTROL ED NAV: NORMAL
GFR SERPL CREATININE-BSD FRML MDRD: 112 ML/MIN/1.73SQ M
GLUCOSE SERPL-MCNC: 85 MG/DL (ref 65–140)
GLUCOSE UR STRIP-MCNC: NEGATIVE MG/DL
GLUCOSE UR STRIP-MCNC: NEGATIVE MG/DL
HCT VFR BLD AUTO: 41 % (ref 34.8–46.1)
HGB BLD-MCNC: 13.1 G/DL (ref 11.5–15.4)
HGB UR QL STRIP.AUTO: ABNORMAL
HGB UR QL STRIP.AUTO: ABNORMAL
IMM GRANULOCYTES # BLD AUTO: 0.01 THOUSAND/UL (ref 0–0.2)
IMM GRANULOCYTES NFR BLD AUTO: 0 % (ref 0–2)
KETONES UR STRIP-MCNC: NEGATIVE MG/DL
KETONES UR STRIP-MCNC: NEGATIVE MG/DL
LEUKOCYTE ESTERASE UR QL STRIP: ABNORMAL
LEUKOCYTE ESTERASE UR QL STRIP: ABNORMAL
LIPASE SERPL-CCNC: 99 U/L (ref 73–393)
LYMPHOCYTES # BLD AUTO: 1.51 THOUSANDS/ΜL (ref 0.6–4.47)
LYMPHOCYTES NFR BLD AUTO: 35 % (ref 14–44)
MCH RBC QN AUTO: 30.2 PG (ref 26.8–34.3)
MCHC RBC AUTO-ENTMCNC: 32 G/DL (ref 31.4–37.4)
MCV RBC AUTO: 95 FL (ref 82–98)
MONOCYTES # BLD AUTO: 0.33 THOUSAND/ΜL (ref 0.17–1.22)
MONOCYTES NFR BLD AUTO: 8 % (ref 4–12)
N GONORRHOEA DNA SPEC QL NAA+PROBE: NEGATIVE
NEUTROPHILS # BLD AUTO: 2.4 THOUSANDS/ΜL (ref 1.85–7.62)
NEUTS SEG NFR BLD AUTO: 54 % (ref 43–75)
NITRITE UR QL STRIP: NEGATIVE
NITRITE UR QL STRIP: NEGATIVE
NON-SQ EPI CELLS URNS QL MICRO: ABNORMAL /HPF
NRBC BLD AUTO-RTO: 0 /100 WBCS
PH UR STRIP.AUTO: 5.5 [PH]
PH UR STRIP.AUTO: 5.5 [PH] (ref 4.5–8)
PLATELET # BLD AUTO: 253 THOUSANDS/UL (ref 149–390)
PMV BLD AUTO: 10.1 FL (ref 8.9–12.7)
POTASSIUM SERPL-SCNC: 4.1 MMOL/L (ref 3.5–5.3)
PROT SERPL-MCNC: 7.3 G/DL (ref 6.4–8.2)
PROT UR STRIP-MCNC: NEGATIVE MG/DL
PROT UR STRIP-MCNC: NEGATIVE MG/DL
RBC # BLD AUTO: 4.34 MILLION/UL (ref 3.81–5.12)
RBC #/AREA URNS AUTO: ABNORMAL /HPF
SODIUM SERPL-SCNC: 139 MMOL/L (ref 136–145)
SP GR UR STRIP.AUTO: 1.01 (ref 1–1.03)
SP GR UR STRIP.AUTO: <=1.005 (ref 1–1.03)
UROBILINOGEN UR QL STRIP.AUTO: 1 E.U./DL
UROBILINOGEN UR QL STRIP.AUTO: 1 E.U./DL
WBC # BLD AUTO: 4.34 THOUSAND/UL (ref 4.31–10.16)
WBC #/AREA URNS AUTO: ABNORMAL /HPF

## 2019-08-21 PROCEDURE — 99284 EMERGENCY DEPT VISIT MOD MDM: CPT

## 2019-08-21 PROCEDURE — 81025 URINE PREGNANCY TEST: CPT | Performed by: PHYSICIAN ASSISTANT

## 2019-08-21 PROCEDURE — 85025 COMPLETE CBC W/AUTO DIFF WBC: CPT | Performed by: PHYSICIAN ASSISTANT

## 2019-08-21 PROCEDURE — 74177 CT ABD & PELVIS W/CONTRAST: CPT

## 2019-08-21 PROCEDURE — 99284 EMERGENCY DEPT VISIT MOD MDM: CPT | Performed by: PHYSICIAN ASSISTANT

## 2019-08-21 PROCEDURE — 87086 URINE CULTURE/COLONY COUNT: CPT | Performed by: PHYSICIAN ASSISTANT

## 2019-08-21 PROCEDURE — 80053 COMPREHEN METABOLIC PANEL: CPT | Performed by: PHYSICIAN ASSISTANT

## 2019-08-21 PROCEDURE — 87491 CHLMYD TRACH DNA AMP PROBE: CPT | Performed by: PHYSICIAN ASSISTANT

## 2019-08-21 PROCEDURE — 96361 HYDRATE IV INFUSION ADD-ON: CPT

## 2019-08-21 PROCEDURE — 81001 URINALYSIS AUTO W/SCOPE: CPT | Performed by: PHYSICIAN ASSISTANT

## 2019-08-21 PROCEDURE — 36415 COLL VENOUS BLD VENIPUNCTURE: CPT | Performed by: PHYSICIAN ASSISTANT

## 2019-08-21 PROCEDURE — 83690 ASSAY OF LIPASE: CPT | Performed by: PHYSICIAN ASSISTANT

## 2019-08-21 PROCEDURE — 96374 THER/PROPH/DIAG INJ IV PUSH: CPT

## 2019-08-21 PROCEDURE — 87591 N.GONORRHOEAE DNA AMP PROB: CPT | Performed by: PHYSICIAN ASSISTANT

## 2019-08-21 RX ORDER — KETOROLAC TROMETHAMINE 30 MG/ML
15 INJECTION, SOLUTION INTRAMUSCULAR; INTRAVENOUS ONCE
Status: COMPLETED | OUTPATIENT
Start: 2019-08-21 | End: 2019-08-21

## 2019-08-21 RX ORDER — CEPHALEXIN 500 MG/1
500 CAPSULE ORAL 2 TIMES DAILY
Qty: 20 CAPSULE | Refills: 0 | Status: SHIPPED | OUTPATIENT
Start: 2019-08-21 | End: 2019-08-31

## 2019-08-21 RX ADMIN — SODIUM CHLORIDE 1000 ML: 0.9 INJECTION, SOLUTION INTRAVENOUS at 07:20

## 2019-08-21 RX ADMIN — KETOROLAC TROMETHAMINE 15 MG: 30 INJECTION, SOLUTION INTRAMUSCULAR at 07:23

## 2019-08-21 RX ADMIN — IOHEXOL 100 ML: 350 INJECTION, SOLUTION INTRAVENOUS at 08:40

## 2019-08-21 NOTE — DISCHARGE INSTRUCTIONS
DISCHARGE INSTRUCTIONS:    FOLLOW UP WITH YOUR PRIMARY CARE PROVIDER OR THE [address] [place] [address]  MAKE AN APPOINTMENT TO BE SEEN  TAKE TYLENOL OR MOTRIN FOR PAIN  TAKE KEFLEX AS PRESCRIBED FOR UTI  IF RASH, SHORTNESS OF BREATH OR TROUBLE SWALLOWING, STOP TAKING THE MEDICATION AND BE SEEN  REST AND DRINK PLENTY OF FLUIDS  IF SYMPTOMS WORSEN OR NEW SYMPTOMS ARISE, RETURN TO THE ER TO BE SEEN

## 2019-08-21 NOTE — ED PROVIDER NOTES
History  Chief Complaint   Patient presents with    Abdominal Pain     Pt reports right lower abd pain that started last night  little bit of diarrhea" Pt denies N/V/C     24y  o female with no significant PMH presents to the ER for RLQ pain for 2 days  Patient denies taking any medication for pain  She is unable to describe her pain  She denies radiation of pain  Symptoms are constant  Associated symptoms: diarrhea  She denies fever, chills, chest pain, dyspnea, nausea, vomiting, urinary symptoms, weakness or paresthesias  History provided by:  Patient   used: No        None       History reviewed  No pertinent past medical history  History reviewed  No pertinent surgical history  History reviewed  No pertinent family history  I have reviewed and agree with the history as documented  Social History     Tobacco Use    Smoking status: Never Smoker    Smokeless tobacco: Never Used   Substance Use Topics    Alcohol use: No    Drug use: No        Review of Systems   Constitutional: Negative for chills and fever  Eyes: Negative for redness  Respiratory: Negative for shortness of breath  Cardiovascular: Negative for chest pain  Gastrointestinal: Positive for abdominal pain and diarrhea  Negative for nausea and vomiting  Genitourinary: Positive for vaginal bleeding (experiencing menses)  Negative for dysuria, frequency, hematuria, urgency and vaginal discharge  Musculoskeletal: Negative for neck stiffness  Skin: Negative for rash  Allergic/Immunologic: Negative for food allergies  Neurological: Negative for weakness and numbness  Physical Exam  Physical Exam   Constitutional:  Non-toxic appearance  No distress  HENT:   Head: Normocephalic and atraumatic  Right Ear: Tympanic membrane, external ear and ear canal normal  No drainage, swelling or tenderness  No foreign bodies  Tympanic membrane is not erythematous  No hemotympanum     Left Ear: Tympanic membrane, external ear and ear canal normal  No drainage, swelling or tenderness  No foreign bodies  Tympanic membrane is not erythematous  No hemotympanum  Nose: Nose normal    Mouth/Throat: Uvula is midline, oropharynx is clear and moist and mucous membranes are normal  No uvula swelling  No posterior oropharyngeal edema, posterior oropharyngeal erythema or tonsillar abscesses  No tonsillar exudate  Neck: Normal range of motion and phonation normal  Neck supple  No tracheal deviation present  Cardiovascular: Normal rate, regular rhythm, S1 normal, S2 normal and normal heart sounds  Exam reveals no gallop and no friction rub  No murmur heard  Pulmonary/Chest: Effort normal and breath sounds normal  No respiratory distress  She has no decreased breath sounds  She has no wheezes  She has no rhonchi  She has no rales  She exhibits no tenderness  Abdominal: Soft  Bowel sounds are normal  She exhibits no distension  There is tenderness in the right lower quadrant and suprapubic area  There is no rebound, no guarding and no CVA tenderness  Neurological: She is alert  GCS eye subscore is 4  GCS verbal subscore is 5  GCS motor subscore is 6  Skin: Skin is warm and dry  No rash noted  Psychiatric: She has a normal mood and affect  Nursing note and vitals reviewed        Vital Signs  ED Triage Vitals   Temperature Pulse Respirations Blood Pressure SpO2   08/21/19 0637 08/21/19 0637 08/21/19 0637 08/21/19 0637 08/21/19 0637   98 2 °F (36 8 °C) 60 16 101/65 99 %      Temp src Heart Rate Source Patient Position - Orthostatic VS BP Location FiO2 (%)   -- 08/21/19 0801 08/21/19 0801 08/21/19 0801 --    Monitor Lying Left arm       Pain Score       08/21/19 0637       8           Vitals:    08/21/19 0637 08/21/19 0801   BP: 101/65 100/61   Pulse: 60 (!) 52   Patient Position - Orthostatic VS:  Lying         Visual Acuity      ED Medications  Medications   ketorolac (TORADOL) injection 15 mg (15 mg Intravenous Given 8/21/19 0723)   sodium chloride 0 9 % bolus 1,000 mL (0 mL Intravenous Stopped 8/21/19 0803)   iohexol (OMNIPAQUE) 350 MG/ML injection (MULTI-DOSE) 100 mL (100 mL Intravenous Given 8/21/19 0840)       Diagnostic Studies  Results Reviewed     Procedure Component Value Units Date/Time    Chlamydia/GC amplified DNA by PCR [[id number]] Collected:  08/21/19 0923    Lab Status:  No result Specimen:  Urine, Other     Comprehensive metabolic panel [[id number]] Collected:  08/21/19 0715    Lab Status:  Final result Specimen:  Blood from Arm, Right Updated:  08/21/19 0808     Sodium 139 mmol/L      Potassium 4 1 mmol/L      Chloride 106 mmol/L      CO2 25 mmol/L      ANION GAP 8 mmol/L      BUN 10 mg/dL      Creatinine 0 74 mg/dL      Glucose 85 mg/dL      Calcium 8 9 mg/dL      AST 17 U/L      ALT 20 U/L      Alkaline Phosphatase 59 U/L      Total Protein 7 3 g/dL      Albumin 3 9 g/dL      Total Bilirubin 0 54 mg/dL      eGFR 112 ml/min/1 73sq m     Narrative:       [place] guidelines for Chronic Kidney Disease (CKD):     Stage 1 with normal or high GFR (GFR > 90 mL/min/1 73 square meters)    Stage 2 Mild CKD (GFR = 60-89 mL/min/1 73 square meters)    Stage 3A Moderate CKD (GFR = 45-59 mL/min/1 73 square meters)    Stage 3B Moderate CKD (GFR = 30-44 mL/min/1 73 square meters)    Stage 4 Severe CKD (GFR = 15-29 mL/min/1 73 square meters)    Stage 5 End Stage CKD (GFR <15 mL/min/1 73 square meters)  Note: GFR calculation is accurate only with a steady state creatinine    Lipase [[id number]]  (Normal) Collected:  08/21/19 0715    Lab Status:  Final result Specimen:  Blood from Arm, Right Updated:  08/21/19 0808     Lipase 99 u/L     ED Urine Macroscopic [[id number]]  (Abnormal) Collected:  08/21/19 0711    Lab Status:  Final result Specimen:  Urine Updated:  08/21/19 0750     Color, UA Yellow     Clarity, UA Cloudy     pH, UA 5 5     Leukocytes, UA Trace     Nitrite, UA Negative     Protein, UA Negative mg/dl      Glucose, UA Negative mg/dl      Ketones, UA Negative mg/dl      Urobilinogen, UA 1 0 E U /dl      Bilirubin, UA Negative     Blood, UA Large     Specific Gravity, UA <=1 005    Narrative:       CLINITEK RESULT    Urine Microscopic [[id number]]  (Abnormal) Collected:  08/21/19 0730    Lab Status:  Final result Specimen:  Urine, Clean Catch Updated:  08/21/19 0746     RBC, UA 2-4 /hpf      WBC, UA 10-20 /hpf      Epithelial Cells Moderate /hpf      Bacteria, UA Occasional /hpf     Urine culture [[id number]] Collected:  08/21/19 0730    Lab Status:   In process Specimen:  Urine, Clean Catch Updated:  08/21/19 0746    UA w Reflex to Microscopic w Reflex to Culture [[id number]]  (Abnormal) Collected:  08/21/19 0730    Lab Status:  Final result Specimen:  Urine, Clean Catch Updated:  08/21/19 0736     Color, UA Yellow     Clarity, UA Clear     Specific Gravity, UA 1 015     pH, UA 5 5     Leukocytes, UA Trace     Nitrite, UA Negative     Protein, UA Negative mg/dl      Glucose, UA Negative mg/dl      Ketones, UA Negative mg/dl      Urobilinogen, UA 1 0 E U /dl      Bilirubin, UA Negative     Blood, UA Large    CBC and differential [[id number]] Collected:  08/21/19 0715    Lab Status:  Final result Specimen:  Blood from Arm, Right Updated:  08/21/19 0727     WBC 4 34 Thousand/uL      RBC 4 34 Million/uL      Hemoglobin 13 1 g/dL      Hematocrit 41 0 %      MCV 95 fL      MCH 30 2 pg      MCHC 32 0 g/dL      RDW 12 5 %      MPV 10 1 fL      Platelets 424 Thousands/uL      nRBC 0 /100 WBCs      Neutrophils Relative 54 %      Immat GRANS % 0 %      Lymphocytes Relative 35 %      Monocytes Relative 8 %      Eosinophils Relative 2 %      Basophils Relative 1 %      Neutrophils Absolute 2 40 Thousands/µL      Immature Grans Absolute 0 01 Thousand/uL      Lymphocytes Absolute 1 51 Thousands/µL      Monocytes Absolute 0 33 Thousand/µL      Eosinophils Absolute 0 07 Thousand/µL      Basophils Absolute 0 02 Thousands/µL     POCT pregnancy, urine [[id number]]  (Normal) Resulted:  08/21/19 0722    Lab Status:  Final result Updated:  08/21/19 0722     EXT PREG TEST UR (Ref: Negative) Negative     Control Valid    POCT urinalysis dipstick [[id number]]  (Abnormal) Resulted:  08/21/19 0713    Lab Status:  Final result Specimen:  Urine Updated:  08/21/19 0722     Color, UA Yellow     Clarity, UA Cloudy                 CT abdomen pelvis with contrast   Final Result by Diego Arriola MD (08/21 1702)      No acute abnormality within the abdomen or pelvis  Normal appendix is visualized  Workstation performed: [id number]                    Procedures  Procedures       ED Course                               MDM  Number of Diagnoses or Management Options  Diarrhea: new and requires workup  RLQ abdominal pain: new and requires workup  UTI (urinary tract infection): new and requires workup  Diagnosis management comments: DDX consists of but not limited to: appendicitis, UTI, ovarian cyst, abdominal pathology    Will check CBC, urine, pregnancy, CMP, lipase and CT abdomen  Will give Toradol and fluids  Informed patient of lab and imaging findings  Patient now reporting urinary symptoms of going more frequently  Will treat for UTI and discharge  At discharge, I instructed the patient to:  -follow up with pcp  -take Tylenol or Motrin for pain  -take Keflex as prescribed for UTI  -rest and drink plenty of fluids  -return to the ER if symptoms worsened or new symptoms arose  Patient agreed to this plan and was stable at time of discharge         Amount and/or Complexity of Data Reviewed  Clinical lab tests: ordered and reviewed  Tests in the radiology section of CPT®: ordered and reviewed    Patient Progress  Patient progress: stable      Disposition  Final diagnoses:   UTI (urinary tract infection)   RLQ abdominal pain   Diarrhea     Time reflects when diagnosis was documented in both MDM as applicable and the Disposition within this note     Time User Action Codes Description Comment    8/21/2019  9:19 AM Duc LUZ Add [N39 0] UTI (urinary tract infection)     8/21/2019  9:19 AM Duc Alfredo A Add [R10 31] RLQ abdominal pain     8/21/2019  9:19 AM [place] Alfredo A Add [R19 7] Diarrhea       ED Disposition     ED Disposition Condition Date/Time Comment    Discharge Stable Wed Aug 21, 2019  9:19 AM Vannasunny Arsalanvic discharge to home/self care  Follow-up Information     Follow up With Specialties Details Why Contact Info Additional [address] Family Medicine Schedule an appointment as soon as possible for a visit  As needed [address], [address] [employer] [address]  [address], [address], [address] [place], [place], [address]          Patient's Medications   Discharge Prescriptions    CEPHALEXIN (KEFLEX) 500 MG CAPSULE    Take 1 capsule (500 mg total) by mouth 2 (two) times a day for 10 days       Start Date: 8/21/2019 End Date: 8/31/2019       Order Dose: 500 mg       Quantity: 20 capsule    Refills: 0     No discharge procedures on file      ED Provider  Electronically Signed by           See Rashid PA-C  08/21/19 9986

## 2019-08-22 LAB — BACTERIA UR CULT: NORMAL

## 2023-04-01 ENCOUNTER — HOSPITAL ENCOUNTER (EMERGENCY)
Facility: HOSPITAL | Age: 28
Discharge: HOME | End: 2023-04-01
Attending: OBSTETRICS & GYNECOLOGY | Admitting: OBSTETRICS & GYNECOLOGY

## 2023-04-01 VITALS
DIASTOLIC BLOOD PRESSURE: 65 MMHG | RESPIRATION RATE: 18 BRPM | TEMPERATURE: 98.5 F | HEART RATE: 110 BPM | OXYGEN SATURATION: 100 % | SYSTOLIC BLOOD PRESSURE: 109 MMHG

## 2023-04-01 DIAGNOSIS — R10.9 ABDOMINAL PAIN AFFECTING PREGNANCY: ICD-10-CM

## 2023-04-01 DIAGNOSIS — O26.899 ABDOMINAL PAIN AFFECTING PREGNANCY: ICD-10-CM

## 2023-04-01 DIAGNOSIS — H81.10 BPPV (BENIGN PAROXYSMAL POSITIONAL VERTIGO): Primary | ICD-10-CM

## 2023-04-01 DIAGNOSIS — R11.2 NAUSEA & VOMITING: ICD-10-CM

## 2023-04-01 PROBLEM — Z3A.18 18 WEEKS GESTATION OF PREGNANCY: Status: ACTIVE | Noted: 2023-04-01

## 2023-04-01 PROBLEM — R19.7 NAUSEA, VOMITING, AND DIARRHEA: Status: ACTIVE | Noted: 2023-04-01

## 2023-04-01 LAB
ALBUMIN SERPL BCP-MCNC: 3.8 G/DL (ref 3.5–5)
ALP SERPL-CCNC: 52 U/L (ref 34–104)
ALT SERPL W P-5'-P-CCNC: 17 U/L (ref 7–52)
ANION GAP SERPL CALCULATED.3IONS-SCNC: 9 MMOL/L (ref 4–13)
AST SERPL W P-5'-P-CCNC: 21 U/L (ref 13–39)
BACTERIA UR QL AUTO: ABNORMAL /HPF
BASOPHILS # BLD AUTO: 0.01 THOUSANDS/ÂΜL (ref 0–0.1)
BASOPHILS NFR BLD AUTO: 0 % (ref 0–1)
BILIRUB SERPL-MCNC: 0.54 MG/DL (ref 0.2–1)
BILIRUB UR QL STRIP: ABNORMAL
BUN SERPL-MCNC: 6 MG/DL (ref 5–25)
CALCIUM SERPL-MCNC: 8.4 MG/DL (ref 8.4–10.2)
CHLORIDE SERPL-SCNC: 104 MMOL/L (ref 96–108)
CLARITY UR: CLEAR
CO2 SERPL-SCNC: 21 MMOL/L (ref 21–32)
COLOR UR: YELLOW
CREAT SERPL-MCNC: 0.53 MG/DL (ref 0.6–1.3)
EOSINOPHIL # BLD AUTO: 0 THOUSAND/ÂΜL (ref 0–0.61)
EOSINOPHIL NFR BLD AUTO: 0 % (ref 0–6)
ERYTHROCYTE [DISTWIDTH] IN BLOOD BY AUTOMATED COUNT: 12.9 % (ref 11.6–15.1)
GFR SERPL CREATININE-BSD FRML MDRD: 127 ML/MIN/1.73SQ M
GLUCOSE SERPL-MCNC: 83 MG/DL (ref 65–140)
GLUCOSE UR STRIP-MCNC: NEGATIVE MG/DL
HCT VFR BLD AUTO: 33.2 % (ref 34.8–46.1)
HGB BLD-MCNC: 11.1 G/DL (ref 11.5–15.4)
HGB UR QL STRIP.AUTO: NEGATIVE
IMM GRANULOCYTES # BLD AUTO: 0.05 THOUSAND/UL (ref 0–0.2)
IMM GRANULOCYTES NFR BLD AUTO: 1 % (ref 0–2)
KETONES UR STRIP-MCNC: ABNORMAL MG/DL
LEUKOCYTE ESTERASE UR QL STRIP: NEGATIVE
LIPASE SERPL-CCNC: 17 U/L (ref 11–82)
LYMPHOCYTES # BLD AUTO: 0.38 THOUSANDS/ÂΜL (ref 0.6–4.47)
LYMPHOCYTES NFR BLD AUTO: 5 % (ref 14–44)
MCH RBC QN AUTO: 31.4 PG (ref 26.8–34.3)
MCHC RBC AUTO-ENTMCNC: 33.4 G/DL (ref 31.4–37.4)
MCV RBC AUTO: 94 FL (ref 82–98)
MONOCYTES # BLD AUTO: 0.34 THOUSAND/ÂΜL (ref 0.17–1.22)
MONOCYTES NFR BLD AUTO: 5 % (ref 4–12)
NEUTROPHILS # BLD AUTO: 6.52 THOUSANDS/ÂΜL (ref 1.85–7.62)
NEUTS SEG NFR BLD AUTO: 89 % (ref 43–75)
NITRITE UR QL STRIP: NEGATIVE
NON-SQ EPI CELLS URNS QL MICRO: ABNORMAL /HPF
NRBC BLD AUTO-RTO: 0 /100 WBCS
PH UR STRIP.AUTO: 6 [PH]
PLATELET # BLD AUTO: 177 THOUSANDS/UL (ref 149–390)
PMV BLD AUTO: 9.6 FL (ref 8.9–12.7)
POTASSIUM SERPL-SCNC: 3.7 MMOL/L (ref 3.5–5.3)
PROT SERPL-MCNC: 6.6 G/DL (ref 6.4–8.4)
PROT UR STRIP-MCNC: NEGATIVE MG/DL
RBC # BLD AUTO: 3.53 MILLION/UL (ref 3.81–5.12)
RBC #/AREA URNS AUTO: ABNORMAL /HPF
SODIUM SERPL-SCNC: 134 MMOL/L (ref 135–147)
SP GR UR STRIP.AUTO: 1.02 (ref 1–1.03)
UROBILINOGEN UR QL STRIP.AUTO: 1 E.U./DL
WBC # BLD AUTO: 7.3 THOUSAND/UL (ref 4.31–10.16)
WBC #/AREA URNS AUTO: ABNORMAL /HPF

## 2023-04-01 RX ORDER — ONDANSETRON 2 MG/ML
4 INJECTION INTRAMUSCULAR; INTRAVENOUS ONCE
Status: COMPLETED | OUTPATIENT
Start: 2023-04-01 | End: 2023-04-01

## 2023-04-01 RX ORDER — MECLIZINE HYDROCHLORIDE 25 MG/1
25 TABLET ORAL ONCE
Status: COMPLETED | OUTPATIENT
Start: 2023-04-01 | End: 2023-04-01

## 2023-04-01 RX ORDER — ONDANSETRON 4 MG/1
4 TABLET, ORALLY DISINTEGRATING ORAL ONCE
Status: COMPLETED | OUTPATIENT
Start: 2023-04-01 | End: 2023-04-01

## 2023-04-01 RX ORDER — MECLIZINE HYDROCHLORIDE 25 MG/1
25 TABLET ORAL ONCE
Status: DISCONTINUED | OUTPATIENT
Start: 2023-04-01 | End: 2023-04-01

## 2023-04-01 RX ADMIN — ONDANSETRON 4 MG: 4 TABLET, ORALLY DISINTEGRATING ORAL at 19:27

## 2023-04-01 RX ADMIN — ONDANSETRON 4 MG: 2 INJECTION INTRAMUSCULAR; INTRAVENOUS at 19:50

## 2023-04-01 RX ADMIN — MECLIZINE HYDROCHLORIDE 25 MG: 25 TABLET ORAL at 20:14

## 2023-04-01 NOTE — ED ATTENDING ATTESTATION
2023  IAdelina MD, saw and evaluated the patient  I have discussed the patient with the resident/non-physician practitioner and agree with the resident's/non-physician practitioner's findings, Plan of Care, and MDM as documented in the resident's/non-physician practitioner's note, except where noted  All available labs and Radiology studies were reviewed  I was present for key portions of any procedure(s) performed by the resident/non-physician practitioner and I was immediately available to provide assistance  At this point I agree with the current assessment done in the Emergency Department  I have conducted an independent evaluation of this patient a history and physical is as follows:  Patient is a 26 yo female, , 18 weeks, c/o dizziness, nausea vomiting, dizziness worse with head position, vomiting is NBNB no fever, headache, neck pain, chest pain, dyspnea, abdominal pain, diarrhea, denies HA, slurred speech, facial droop  On exam patient is conscious, alert, vital signs noted for tachycardia, no acute distress, pupils equal round active light, no cranial nerve or focal neurodeficits, motor/sensory exam intact in bilateral upper and lower extremities, abdomen soft, mild tenderness in epigastrium; bedside ultrasound done   Differential diagnosis: Nonspecific dizziness, vertigo, hyperemesis gravidarum, will check labs, give IV fluids, nausea meds, contact OB/GYN for further evaluation  ED Course     Labs reviewed, no significant acute normality  ER resident discussed the case with OB/GYN, patient was transferred to L&D for further evaluation      Critical Care Time  Procedures

## 2023-04-02 NOTE — PROGRESS NOTES
L&D Triage Note - OB/GYN  Jarocho White 25 y o  female MRN: [id number]  Unit/Bed#: L&D 329-01 Encounter: [id number]      ASSESSMENT:    Jarocho White is a 25 y o  [id number] at 18w4d who presents with viral gastroenteritis  Prior N/V/D and abdominal cramping resolved on presentation s/p IV fluids, Zofran, and Meclizine in ED  Declined PTL workup but agreed to cervical exam  SVE cl/th/hi  Stable for discharge  PLAN:    1) R/o PTL  - Declined speculum exam and TVUS  - SVE 0/0/-5  - FHT 155bpm via Doppler  - UA significant for ketones most likely 2/2 dehydration from acute symptoms  - UA negative leukocytes, negative nitrites  - N/V/D symptoms improved with IV fluids, 4mg IV Zofran, and 25mg PO meclizine in ED  - Recommended follow-up outpatient with obstetrician next week  - Rout[facility] follows with [facility]    2) Discharge from [facility] triage with [id number] labor precautions    - Reviewed rupture of membranes, false vs true labor, decreased fetal movement, and vaginal bleeding   - Pt to call provider with any concerns and follow up at her next scheduled prenatal appointment    - Continue routine prenatal care   - Case discussed with Dr Roe Faulkner:    Jarocho White 25 y o   at 18w4d with an Estimated Date of Delivery: 23 who presents from the ED for obstetrical focused exam  Initial presentation in ED for viral gastroenteritis with symptoms including nausea, vomiting, diarrhea, and abdominal cramping  Recent sick contact - her son and  have been experiencing similar symptoms  On presentation, denies ongoing symptoms and reports feeling well  Denies contractions or other obstetrical complaints        Her past obstetrical history is significant for 1x term , 2x SAB    Contractions: denies  Leakage of fluid: denies  Vaginal Bleeding: denies  Fetal movement: present, flutters    OBJECTIVE:    Vitals:    239   BP:    Pulse:    Resp: 18   Temp: 98 5 °F (36 9 °C)   SpO2:        ROS:  Constitutional: Negative  Respiratory: Negative  Cardiovascular: Negative    Gastrointestinal: Negative    General Physical Exam:  General: in no apparent distress and well developed and well nourished  Cardiovascular: intact distals pulses  Lungs: non-labored breathing  Abdomen: abdomen is soft without significant tenderness, masses, organomegaly or guarding  Lower extremeties: nontender, b/l Mick's sign negative    Cervical Exam  SVE: 0 / 0% / -5    Fetal monitoring:  FHT:  155 bpm via TAUS  [place]: contractions absent     Urine Dip    - negative for signs of infection          Kenya Womack MD  OBGYN PGY-2  4/1/2023 10:09 PM

## 2023-04-02 NOTE — ED PROVIDER NOTES
History  Chief Complaint   Patient presents with   • Vomiting   • Abdominal Pain     Pt c/o N/V/D onset this AM  Pt reports epigastric and lower abd pain  Pt approx 18 weeks pregnant  Denies contractions/vaginal bleeding  Pt reports son recently sick with stomach bug     Patient is a 20-year-old female G4, P1 18w3d GA who presents with vomiting  Patient states that this morning she woke up with nausea and vomiting  She says it is nonbilious nonbloody  She has also had associated sensation of the room spinning which is worse when she lies flat and when she looks to the left  She denies any associated headache, vision changes, weakness, or numbness  She also endorses epigastric and left lower quadrant abdominal pain that is started today  She states the do not feel like contractions  She denies any vaginal bleeding or discharge, no loss of fluids  She has not been able to keep any food or liquids down today  He has not taken anything for symptoms  She went to [facility]  emergency department but left prior to being seen  None       History reviewed  No pertinent past medical history  History reviewed  No pertinent surgical history  History reviewed  No pertinent family history  I have reviewed and agree with the history as documented  E-Cigarette/Vaping     E-Cigarette/Vaping Substances     Social History     Tobacco Use   • Smoking status: Never   • Smokeless tobacco: Never   Substance Use Topics   • Alcohol use: No   • Drug use: No        Review of Systems   Constitutional: Negative for chills and fever  HENT: Negative for congestion and rhinorrhea  Eyes: Negative for photophobia, pain and visual disturbance  Respiratory: Negative for cough and shortness of breath  Cardiovascular: Negative for chest pain and palpitations  Gastrointestinal: Positive for abdominal pain, nausea and vomiting  Negative for blood in stool and diarrhea     Genitourinary: Negative for dysuria and frequency  Musculoskeletal: Negative for back pain and neck pain  Skin: Negative for pallor and rash  Neurological: Positive for dizziness  Negative for speech difficulty, weakness, light-headedness, numbness and headaches  Psychiatric/Behavioral: Negative for confusion and decreased concentration  Physical Exam  ED Triage Vitals [04/01/23 1856]   Temperature Pulse Respirations Blood Pressure SpO2   99 2 °F (37 3 °C) (!) 119 18 129/84 99 %      Temp Source Heart Rate Source Patient Position - Orthostatic VS BP Location FiO2 (%)   Oral -- -- -- --      Pain Score       --             Orthostatic Vital Signs  Vitals:    04/01/23 1856   BP: 129/84   Pulse: (!) 119       Physical Exam  Vitals and nursing note reviewed  Constitutional:       General: She is not in acute distress  Appearance: She is well-developed and normal weight  She is not ill-appearing, toxic-appearing or diaphoretic  HENT:      Head: Normocephalic and atraumatic  Mouth/Throat:      Mouth: Mucous membranes are moist       Pharynx: Oropharynx is clear  No pharyngeal swelling or oropharyngeal exudate  Eyes:      General: No scleral icterus  Extraocular Movements: Extraocular movements intact  Pupils: Pupils are equal, round, and reactive to light  Cardiovascular:      Rate and Rhythm: Regular rhythm  Tachycardia present  Heart sounds: Normal heart sounds  No murmur heard  No friction rub  No gallop  Pulmonary:      Effort: Pulmonary effort is normal  No respiratory distress  Breath sounds: Normal breath sounds  No wheezing or rales  Abdominal:      General: Abdomen is protuberant  Bowel sounds are normal  There is no distension  Palpations: Abdomen is soft  Tenderness: There is abdominal tenderness in the epigastric area and left lower quadrant  There is no right CVA tenderness, left CVA tenderness or guarding  Skin:     General: Skin is warm and dry        Coloration: Skin is not cyanotic or jaundiced  Neurological:      General: No focal deficit present  Mental Status: She is alert and oriented to person, place, and time  Cranial Nerves: No cranial nerve deficit  Motor: No weakness  Comments: Appropriate finger-nose testing and heel-to-shin  No dysdiadochokinesia  Patient can ambulate to the door of the room and back to bed without issue  Psychiatric:         Mood and Affect: Mood normal  Mood is not anxious or depressed           Behavior: Behavior normal          ED Medications  Medications   meclizine (ANTIVERT) tablet 25 mg (25 mg Oral Given 4/1/23 2014)   ondansetron (ZOFRAN-ODT) dispersible tablet 4 mg (4 mg Oral Given 4/1/23 1927)   ondansetron (ZOFRAN) injection 4 mg (4 mg Intravenous Given 4/1/23 1950)       Diagnostic Studies  Results Reviewed     Procedure Component Value Units Date/Time    Comprehensive metabolic panel [[id number]]  (Abnormal) Collected: 04/01/23 2003    Lab Status: Final result Specimen: Blood from Arm, Right Updated: 04/01/23 2028     Sodium 134 mmol/L      Potassium 3 7 mmol/L      Chloride 104 mmol/L      CO2 21 mmol/L      ANION GAP 9 mmol/L      BUN 6 mg/dL      Creatinine 0 53 mg/dL      Glucose 83 mg/dL      Calcium 8 4 mg/dL      AST 21 U/L      ALT 17 U/L      Alkaline Phosphatase 52 U/L      Total Protein 6 6 g/dL      Albumin 3 8 g/dL      Total Bilirubin 0 54 mg/dL      eGFR 127 ml/min/1 73sq m     Narrative:      [place] guidelines for Chronic Kidney Disease (CKD):   •  Stage 1 with normal or high GFR (GFR > 90 mL/min/1 73 square meters)  •  Stage 2 Mild CKD (GFR = 60-89 mL/min/1 73 square meters)  •  Stage 3A Moderate CKD (GFR = 45-59 mL/min/1 73 square meters)  •  Stage 3B Moderate CKD (GFR = 30-44 mL/min/1 73 square meters)  •  Stage 4 Severe CKD (GFR = 15-29 mL/min/1 73 square meters)  •  Stage 5 End Stage CKD (GFR <15 mL/min/1 73 square meters)  Note: GFR calculation is accurate only with a steady state creatinine    Lipase [[id number]]  (Normal) Collected: 23    Lab Status: Final result Specimen: Blood from Arm, Right Updated: 23     Lipase 17 u/L     CBC and differential [[id number]]  (Abnormal) Collected: 23    Lab Status: Final result Specimen: Blood from Arm, Right Updated: 23     WBC 7 30 Thousand/uL      RBC 3 53 Million/uL      Hemoglobin 11 1 g/dL      Hematocrit 33 2 %      MCV 94 fL      MCH 31 4 pg      MCHC 33 4 g/dL      RDW 12 9 %      MPV 9 6 fL      Platelets 640 Thousands/uL      nRBC 0 /100 WBCs      Neutrophils Relative 89 %      Immat GRANS % 1 %      Lymphocytes Relative 5 %      Monocytes Relative 5 %      Eosinophils Relative 0 %      Basophils Relative 0 %      Neutrophils Absolute 6 52 Thousands/µL      Immature Grans Absolute 0 05 Thousand/uL      Lymphocytes Absolute 0 38 Thousands/µL      Monocytes Absolute 0 34 Thousand/µL      Eosinophils Absolute 0 00 Thousand/µL      Basophils Absolute 0 01 Thousands/µL                  No orders to display         Procedures  POC Pelvic US    Date/Time: 2023 8:27 PM  Performed by: Corby Torrez MD  Authorized by: Corby Torrez MD     Patient location:  ED  Procedure details:     Indications: pregnant with abdominal pain      Assessment for: evaluate fetal viability      Technique:  Transabdominal obstetric (HCG+) exam    Views obtained: uterus (transverse and sagittal)      Image quality: diagnostic      Image availability:  Images available in PACS  Uterine findings:     Fetal heart rate: identified      Fetal heart rate (bpm):  165  Interpretation:     Pregnancy findings: intrauterine pregnancy (IUP)            ED Course  ED Course as of 23   Sat  Hemoglobin(!): 11 1                                       Medical Decision Making  Patient is a 26 y/o  female at [address] [place] [address] who presents with abdominal pain and dizziness      Patient is well appearing in no acute distress  She does not appear dehydrated on exam, mild tenderness to palpation on abdominal exam, neuro exam is nonfocal, able to ambulate without difficulty  This likely has gastroenteritis, possibly BPPV, I also considered pulmonary disease, pancreatitis, diverticulitis  Will check CBC electrolytes, IV fluids, Zofran and meclizine for symptom control  Patient initially cannot tolerate p o  Zofran, switch to IV Zofran and will give meclizine when she can tolerate p o  Her labs are grossly unremarkable with a slight anemia  Patient reports improvement of symptoms after Zofran, will give meclizine now and transfer patient to [facility] triage for further work-up  Abdominal pain affecting pregnancy: acute illness or injury  BPPV (benign paroxysmal positional vertigo): acute illness or injury  Nausea & vomiting: acute illness or injury  Amount and/or Complexity of Data Reviewed  External Data Reviewed: notes  Labs: ordered  Decision-making details documented in ED Course  Risk  Prescription drug management  Disposition  Final diagnoses:   BPPV (benign paroxysmal positional vertigo)   Nausea & vomiting   Abdominal pain affecting pregnancy     Time reflects when diagnosis was documented in both MDM as applicable and the Disposition within this note     Time User Action Codes Description Comment    4/1/2023  8:44 PM Porfirio Hatfield Add [H81 10] BPPV (benign paroxysmal positional vertigo)     4/1/2023  8:44 PM Porfirio Shearer [R11 2] Nausea & vomiting     4/1/2023  8:44 PM Ben Wilkes [O26 899,  R10 9] Abdominal pain affecting pregnancy       ED Disposition     ED Disposition   Send to L&D After Provider Eval    Condition   --    Date/Time   Sat Apr 1, 2023  8:44 PM    Comment   --         Follow-up Information    None         Patient's Medications    No medications on file     No discharge procedures on file      PDMP Review     None           ED Provider  Attending physically available and evaluated Arturo Opitz I managed the patient along with the ED Attending      Electronically Signed by         Vamshi Leone MD  04/01/23 0670

## 2023-12-19 ENCOUNTER — HOSPITAL ENCOUNTER (EMERGENCY)
Facility: HOSPITAL | Age: 28
Discharge: HOME | End: 2023-12-19
Attending: EMERGENCY MEDICINE
Payer: MEDICARE

## 2023-12-19 VITALS
SYSTOLIC BLOOD PRESSURE: 119 MMHG | WEIGHT: 159.39 LBS | RESPIRATION RATE: 16 BRPM | OXYGEN SATURATION: 97 % | DIASTOLIC BLOOD PRESSURE: 60 MMHG | TEMPERATURE: 98.5 F | HEART RATE: 86 BPM

## 2023-12-19 DIAGNOSIS — R10.9 ABDOMINAL PAIN: Primary | ICD-10-CM

## 2023-12-19 DIAGNOSIS — R11.2 NAUSEA AND VOMITING: ICD-10-CM

## 2023-12-19 DIAGNOSIS — R19.7 DIARRHEA: ICD-10-CM

## 2023-12-19 LAB
BACTERIA UR QL AUTO: ABNORMAL /HPF
BILIRUB UR QL STRIP: ABNORMAL
CLARITY UR: CLEAR
COLOR UR: YELLOW
EXT PREGNANCY TEST URINE: NEGATIVE
EXT. CONTROL: NORMAL
GLUCOSE UR STRIP-MCNC: NEGATIVE MG/DL
HGB UR QL STRIP.AUTO: NEGATIVE
HYALINE CASTS #/AREA URNS LPF: ABNORMAL /LPF
KETONES UR STRIP-MCNC: ABNORMAL MG/DL
LEUKOCYTE ESTERASE UR QL STRIP: NEGATIVE
MUCOUS THREADS UR QL AUTO: ABNORMAL
NITRITE UR QL STRIP: NEGATIVE
NON-SQ EPI CELLS URNS QL MICRO: ABNORMAL /HPF
PH UR STRIP.AUTO: 5 [PH] (ref 4.5–8)
PROT UR STRIP-MCNC: ABNORMAL MG/DL
RBC #/AREA URNS AUTO: ABNORMAL /HPF
SP GR UR STRIP.AUTO: >=1.03 (ref 1–1.03)
UROBILINOGEN UR QL STRIP.AUTO: 1 E.U./DL
WBC #/AREA URNS AUTO: ABNORMAL /HPF

## 2023-12-19 PROCEDURE — 99284 EMERGENCY DEPT VISIT MOD MDM: CPT | Performed by: PHYSICIAN ASSISTANT

## 2023-12-19 PROCEDURE — 81001 URINALYSIS AUTO W/SCOPE: CPT

## 2023-12-19 PROCEDURE — 96372 THER/PROPH/DIAG INJ SC/IM: CPT

## 2023-12-19 PROCEDURE — 99284 EMERGENCY DEPT VISIT MOD MDM: CPT

## 2023-12-19 PROCEDURE — 81025 URINE PREGNANCY TEST: CPT | Performed by: PHYSICIAN ASSISTANT

## 2023-12-19 RX ORDER — METOCLOPRAMIDE 10 MG/1
10 TABLET ORAL ONCE
Status: COMPLETED | OUTPATIENT
Start: 2023-12-19 | End: 2023-12-19

## 2023-12-19 RX ORDER — DICYCLOMINE HCL 20 MG
20 TABLET ORAL ONCE
Status: COMPLETED | OUTPATIENT
Start: 2023-12-19 | End: 2023-12-19

## 2023-12-19 RX ORDER — DICYCLOMINE HCL 20 MG
20 TABLET ORAL 2 TIMES DAILY
Qty: 20 TABLET | Refills: 0 | Status: SHIPPED | OUTPATIENT
Start: 2023-12-19

## 2023-12-19 RX ORDER — KETOROLAC TROMETHAMINE 30 MG/ML
15 INJECTION, SOLUTION INTRAMUSCULAR; INTRAVENOUS ONCE
Status: COMPLETED | OUTPATIENT
Start: 2023-12-19 | End: 2023-12-19

## 2023-12-19 RX ORDER — ONDANSETRON 4 MG/1
4 TABLET, ORALLY DISINTEGRATING ORAL ONCE
Status: COMPLETED | OUTPATIENT
Start: 2023-12-19 | End: 2023-12-19

## 2023-12-19 RX ORDER — ONDANSETRON 4 MG/1
4 TABLET, ORALLY DISINTEGRATING ORAL EVERY 6 HOURS PRN
Qty: 20 TABLET | Refills: 0 | Status: SHIPPED | OUTPATIENT
Start: 2023-12-19

## 2023-12-19 RX ADMIN — ONDANSETRON 4 MG: 4 TABLET, ORALLY DISINTEGRATING ORAL at 20:00

## 2023-12-19 RX ADMIN — DICYCLOMINE HYDROCHLORIDE 20 MG: 20 TABLET ORAL at 20:00

## 2023-12-19 RX ADMIN — METOCLOPRAMIDE 10 MG: 10 TABLET ORAL at 20:35

## 2023-12-19 RX ADMIN — KETOROLAC TROMETHAMINE 15 MG: 30 INJECTION, SOLUTION INTRAMUSCULAR at 20:02

## 2023-12-19 NOTE — Clinical Note
Clarice Carr was seen and treated in our emergency department on 12/19/2023.    No restrictions            Diagnosis: Abdominal Pain    Clarice  may return to work on return date.    She may return on this date: 12/21/2023         If you have any questions or concerns, please don't hesitate to call.      Capri Domingo PA-C    ______________________________           _______________          _______________  Hospital Representative                              Date                                Time

## 2023-12-20 NOTE — ED PROVIDER NOTES
History  Chief Complaint   Patient presents with    Abdominal Pain     States upper abd pain and vomiting starting today. Denies fevers, sick contacts.      This is a 28-year-old female presenting to ED for evaluation of epigastric pain, vomiting and diarrhea.  Patient states that she started with symptoms today.  She denies any  sick contacts.  She has had multiple episodes of nonbloody nonbilious emesis as well as nonbloody diarrhea.  She denies any dysuria, hematuria or urgency.  She denies any back pain.  She denies any concern for food poisoning.  She denies any fevers, chills, URI symptoms, headache, dizziness, lightheadedness.      History provided by:  Patient   used: No        None       History reviewed. No pertinent past medical history.    History reviewed. No pertinent surgical history.    History reviewed. No pertinent family history.  I have reviewed and agree with the history as documented.    E-Cigarette/Vaping     E-Cigarette/Vaping Substances     Social History     Tobacco Use    Smoking status: Never    Smokeless tobacco: Never   Substance Use Topics    Alcohol use: No    Drug use: No       Review of Systems   Constitutional:  Negative for chills and fever.   HENT:  Negative for congestion.    Respiratory:  Negative for cough.    Gastrointestinal:  Positive for abdominal pain, diarrhea, nausea and vomiting.   Genitourinary:  Negative for dysuria, frequency and urgency.   All other systems reviewed and are negative.      Physical Exam  Physical Exam  Vitals reviewed.   Constitutional:       General: She is not in acute distress.     Appearance: Normal appearance. She is well-developed and well-groomed. She is not ill-appearing, toxic-appearing or diaphoretic.   HENT:      Head: Normocephalic and atraumatic.      Right Ear: External ear normal.      Left Ear: External ear normal.      Nose: Nose normal. No congestion or rhinorrhea.      Mouth/Throat:      Mouth: Mucous membranes  are moist.      Pharynx: Oropharynx is clear. No oropharyngeal exudate or posterior oropharyngeal erythema.   Eyes:      General: No scleral icterus.        Right eye: No discharge.         Left eye: No discharge.      Extraocular Movements: Extraocular movements intact.      Conjunctiva/sclera: Conjunctivae normal.   Cardiovascular:      Rate and Rhythm: Normal rate and regular rhythm.      Pulses: Normal pulses.      Heart sounds: No murmur heard.     No friction rub. No gallop.   Pulmonary:      Effort: Pulmonary effort is normal. No respiratory distress.      Breath sounds: Normal breath sounds. No wheezing, rhonchi or rales.   Abdominal:      General: Abdomen is flat. There is no distension.      Palpations: Abdomen is soft.      Tenderness: There is no abdominal tenderness. There is no right CVA tenderness, left CVA tenderness, guarding or rebound.   Musculoskeletal:         General: No deformity. Normal range of motion.      Cervical back: Normal range of motion and neck supple.   Skin:     General: Skin is warm and dry.      Coloration: Skin is not jaundiced or pale.      Findings: No rash.   Neurological:      General: No focal deficit present.      Mental Status: She is alert.   Psychiatric:         Mood and Affect: Mood normal.         Behavior: Behavior normal. Behavior is cooperative.         Vital Signs  ED Triage Vitals [12/19/23 1932]   Temperature Pulse Respirations Blood Pressure SpO2   98.5 °F (36.9 °C) (!) 108 18 136/83 98 %      Temp Source Heart Rate Source Patient Position - Orthostatic VS BP Location FiO2 (%)   Oral Monitor Sitting Right arm --      Pain Score       8           Vitals:    12/19/23 1932 12/19/23 2045   BP: 136/83 119/60   Pulse: (!) 108 86   Patient Position - Orthostatic VS: Sitting Lying         Visual Acuity      ED Medications  Medications   ondansetron (ZOFRAN-ODT) dispersible tablet 4 mg (4 mg Oral Given 12/19/23 2000)   dicyclomine (BENTYL) tablet 20 mg (20 mg Oral  Given 12/19/23 2000)   ketorolac (TORADOL) injection 15 mg (15 mg Intramuscular Given 12/19/23 2002)   metoclopramide (REGLAN) tablet 10 mg (10 mg Oral Given 12/19/23 2035)       Diagnostic Studies  Results Reviewed       Procedure Component Value Units Date/Time    Urine Microscopic [[id number]]  (Abnormal) Collected: 12/19/23 1957    Lab Status: Final result Specimen: Urine, Clean Catch Updated: 12/19/23 2027     RBC, UA 1-2 /hpf      WBC, UA 4-10 /hpf      Epithelial Cells Moderate /hpf      Bacteria, UA Occasional /hpf      MUCUS THREADS Innumerable     Hyaline Casts, UA 0-3 /lpf     POCT pregnancy, urine [[id number]]  (Normal) Resulted: 12/19/23 2001    Lab Status: Final result Updated: 12/19/23 2001     EXT Preg Test, Ur Negative     Control Valid    Urine Macroscopic, POC [[id number]]  (Abnormal) Collected: 12/19/23 1957    Lab Status: Final result Specimen: Urine Updated: 12/19/23 1958     Color, UA Yellow     Clarity, UA Clear     pH, UA 5.0     Leukocytes, UA Negative     Nitrite, UA Negative     Protein, UA 30 (1+) mg/dl      Glucose, UA Negative mg/dl      Ketones, UA Trace mg/dl      Urobilinogen, UA 1.0 E.U./dl      Bilirubin, UA Small     Occult Blood, UA Negative     Specific Gravity, UA >=1.030    Narrative:      CLINITEK RESULT                   No orders to display              Procedures  Procedures         ED Course  ED Course as of 12/19/23 2209   Tue Dec 19, 2023   2027 Urine Macroscopic, POC(!)  No leuks/nitrites low suspicion of UTI                               SBIRT 22yo+      Flowsheet Row Most Recent Value   Initial Alcohol Screen: US AUDIT-C     1. How often do you have a drink containing alcohol? 0 Filed at: 12/19/2023 2005   2. How many drinks containing alcohol do you have on a typical day you are drinking?  0 Filed at: 12/19/2023 2005   3a. Male UNDER 65: How often do you have five or more drinks on one occasion? 0 Filed at: 12/19/2023 2005   3b. FEMALE Any Age, or MALE 65+: How often  do you have 4 or more drinks on one occassion? 0 Filed at: 12/19/2023 2005   Audit-C Score 0 Filed at: 12/19/2023 2005   NICOLETTE: How many times in the past year have you...    Used an illegal drug or used a prescription medication for non-medical reasons? Never Filed at: 12/19/2023 2005                      Medical Decision Making      DDx including but not limited to: food poisoning, viral illness, colitis, enteritis, metabolic abnormality, IBS, IBD, ileus, bowel obstruction, appendicitis, pancreatitis, hepatitis, mesenteric adenitis, mesenteric ischemia, toxic megacolon, cholecystitis, biliary colic, pyelonephritis, UTI, renal colic.      Patient presenting to ED for evaluation of diarrhea, nausea, vomiting and abdominal pain.  Patient states symptoms started this morning.  Patient's abdomen is soft, nontender nondistended on exam.  Will check U preg for eval of pregnancy status and UA for UTI.  Given benign abdominal exam, will hold off on labs and imaging at this time.  Will treat symptomatically with Zofran, Toradol, Bentyl and reevaluate.    Patient states abdominal pain resolved, she continues to have some nausea, will treat with Reglan.    Patient feels improved at this time, patient stable for discharge.  Reviewed strict return precautions patient bedside, patient verbalized understanding signs and symptoms that warrant return to the ED.    Prior to discharge, discharge instructions were discussed with patient at bedside. Patient was provided both verbal and written instructions. Patient is understanding of the discharge instructions and is agreeable to plan of care. Return precautions were discussed with patient bedside, patient verbalized understanding of signs and symptoms that would necessitate return to the ED. All questions were answered. Patient was comfortable with the plan of care and discharged to home.     Dispo: discharge home with follow up to PCP. Patient stable, in no acute distress and  non-toxic at discharge.    Problems Addressed:  Abdominal pain: acute illness or injury  Diarrhea: acute illness or injury  Nausea and vomiting: acute illness or injury    Amount and/or Complexity of Data Reviewed  Labs: ordered. Decision-making details documented in ED Course.    Risk  Prescription drug management.             Disposition  Final diagnoses:   Abdominal pain   Nausea and vomiting   Diarrhea     Time reflects when diagnosis was documented in both MDM as applicable and the Disposition within this note       Time User Action Codes Description Comment    12/19/2023  9:08 PM Capri Domingo Add [R10.9] Abdominal pain     12/19/2023  9:08 PM Capri Domingo Add [R11.2] Nausea and vomiting     12/19/2023  9:08 PM Jordyn Domingosea Add [R19.7] Diarrhea           ED Disposition       ED Disposition   Discharge    Condition   Stable    Date/Time   Tue Dec 19, 2023 2108    Comment   Clarice CaceresronSantiago discharge to home/self care.                   Follow-up Information       Follow up With Specialties Details Why Contact Info Additional Information    [facility] [place] [facility]   [address], [address]  [place] [address]  [phone] [facility] [place], [phone]     Via the [address] of the [place] (North/South) Take [address] toward [place]. Take the [facility] [address]. Keep right and follow signs for [address]/ [place]. Merge onto [address]. In a half mile, take the exit for [address] toward [place]. In 0.7 miles take the [facility] Exit. Merge onto [facility]. In 500 feet, turn left on [address] and drive 0.3 miles. [facility] will be on your left.    [address]ia [address] ([address]) Take [address] toward [place]. Take the [facility] Exit. Merge onto [facility]. In 500 feet, turn left on [address] and drive 0.3 miles. [facility] will be on  your left.  [address]ia [address] ([address]) Take [address] towards [place]. In 0.7 miles take the [facility] Exit. Merge onto [facility]. In 500 feet, turn left on [address] and drive 0.3 miles. [facility] will be on your left.            Discharge Medication List as of 12/19/2023  9:10 PM        START taking these medications    Details   dicyclomine (BENTYL) 20 mg tablet Take 1 tablet (20 mg total) by mouth 2 (two) times a day, Starting Tue 12/19/2023, Normal      ondansetron (ZOFRAN-ODT) 4 mg disintegrating tablet Take 1 tablet (4 mg total) by mouth every 6 (six) hours as needed for nausea or vomiting, Starting Tue 12/19/2023, Normal             No discharge procedures on file.    PDMP Review       None            ED Provider  Electronically Signed by Capri Domingo PA-C  12/19/23 9674

## 2024-09-06 ENCOUNTER — HOSPITAL ENCOUNTER (EMERGENCY)
Facility: HOSPITAL | Age: 29
Discharge: HOME | End: 2024-09-06
Attending: EMERGENCY MEDICINE
Payer: MEDICARE

## 2024-09-06 VITALS
HEART RATE: 72 BPM | DIASTOLIC BLOOD PRESSURE: 65 MMHG | SYSTOLIC BLOOD PRESSURE: 133 MMHG | OXYGEN SATURATION: 100 % | WEIGHT: 161.38 LBS | TEMPERATURE: 98.4 F | RESPIRATION RATE: 16 BRPM

## 2024-09-06 DIAGNOSIS — K04.7 DENTAL INFECTION: ICD-10-CM

## 2024-09-06 DIAGNOSIS — K04.7 DENTAL ABSCESS: Primary | ICD-10-CM

## 2024-09-06 PROCEDURE — 99284 EMERGENCY DEPT VISIT MOD MDM: CPT | Performed by: EMERGENCY MEDICINE

## 2024-09-06 PROCEDURE — 99282 EMERGENCY DEPT VISIT SF MDM: CPT

## 2024-09-06 PROCEDURE — 96372 THER/PROPH/DIAG INJ SC/IM: CPT

## 2024-09-06 RX ORDER — IBUPROFEN 800 MG/1
800 TABLET, FILM COATED ORAL EVERY 8 HOURS PRN
Qty: 30 TABLET | Refills: 0 | Status: SHIPPED | OUTPATIENT
Start: 2024-09-06 | End: 2024-09-16

## 2024-09-06 RX ORDER — KETOROLAC TROMETHAMINE 30 MG/ML
30 INJECTION, SOLUTION INTRAMUSCULAR; INTRAVENOUS ONCE
Status: COMPLETED | OUTPATIENT
Start: 2024-09-06 | End: 2024-09-06

## 2024-09-06 RX ORDER — SENNOSIDES 8.6 MG
650 CAPSULE ORAL EVERY 6 HOURS PRN
Qty: 30 TABLET | Refills: 0 | Status: SHIPPED | OUTPATIENT
Start: 2024-09-06

## 2024-09-06 RX ORDER — ACETAMINOPHEN 325 MG/1
975 TABLET ORAL ONCE
Status: COMPLETED | OUTPATIENT
Start: 2024-09-06 | End: 2024-09-06

## 2024-09-06 RX ORDER — PENICILLIN V POTASSIUM 500 MG/1
500 TABLET, FILM COATED ORAL 4 TIMES DAILY
Qty: 28 TABLET | Refills: 0 | Status: SHIPPED | OUTPATIENT
Start: 2024-09-06 | End: 2024-09-13

## 2024-09-06 RX ORDER — PENICILLIN V POTASSIUM 250 MG/1
500 TABLET, FILM COATED ORAL ONCE
Status: COMPLETED | OUTPATIENT
Start: 2024-09-06 | End: 2024-09-06

## 2024-09-06 RX ADMIN — ACETAMINOPHEN 975 MG: 325 TABLET ORAL at 21:37

## 2024-09-06 RX ADMIN — KETOROLAC TROMETHAMINE 30 MG: 30 INJECTION, SOLUTION INTRAMUSCULAR; INTRAVENOUS at 21:39

## 2024-09-06 RX ADMIN — PENICILLIN V POTASSIUM 500 MG: 250 TABLET, FILM COATED ORAL at 21:37

## 2024-09-07 NOTE — ED PROVIDER NOTES
History  Chief Complaint   Patient presents with    Dental Pain     Pt states she has had right upper dental pain x1 week. Pt states she had cavity filled in same tooth last month and is now having pain again. - fevers     30 yo female with L upper molar dental pain and abscess.  Had cavity filled 1 mo ago and a few days ago felt abscess above tooth and some discomfort - pain worsened today and now throbbing.        History provided by:  Patient   used: No    Dental Pain  Location:  Upper  Upper teeth location:  15/DELLA 2nd molar  Quality:  Aching and dull  Severity:  Moderate  Onset quality:  Gradual  Duration:  1 day  Timing:  Constant  Progression:  Worsening  Chronicity:  New  Context: dental caries    Previous work-up:  Dental exam and filled cavity  Relieved by:  Nothing  Worsened by:  Cold food/drink, hot food/drink and pressure  Ineffective treatments:  None tried  Associated symptoms: gum swelling (abscess periapical)    Associated symptoms: no drooling, no facial pain, no facial swelling, no fever, no neck pain, no oral lesions and no trismus        Prior to Admission Medications   Prescriptions Last Dose Informant Patient Reported? Taking?   dicyclomine (BENTYL) 20 mg tablet   No No   Sig: Take 1 tablet (20 mg total) by mouth 2 (two) times a day   ondansetron (ZOFRAN-ODT) 4 mg disintegrating tablet   No No   Sig: Take 1 tablet (4 mg total) by mouth every 6 (six) hours as needed for nausea or vomiting      Facility-Administered Medications: None       History reviewed. No pertinent past medical history.    History reviewed. No pertinent surgical history.    History reviewed. No pertinent family history.  I have reviewed and agree with the history as documented.    E-Cigarette/Vaping     E-Cigarette/Vaping Substances     Social History     Tobacco Use    Smoking status: Never    Smokeless tobacco: Never   Substance Use Topics    Alcohol use: No    Drug use: No       Review of Systems    Constitutional:  Negative for chills and fever.   HENT:  Positive for dental problem. Negative for drooling, ear pain, facial swelling, mouth sores and sore throat.    Eyes:  Negative for pain and visual disturbance.   Respiratory:  Negative for cough and shortness of breath.    Cardiovascular:  Negative for chest pain and palpitations.   Gastrointestinal:  Negative for abdominal pain and vomiting.   Genitourinary:  Negative for dysuria and hematuria.   Musculoskeletal:  Negative for arthralgias, back pain and neck pain.   Skin:  Negative for color change and rash.   Neurological:  Negative for seizures and syncope.   All other systems reviewed and are negative.      Physical Exam  Physical Exam  Vitals and nursing note reviewed.   Constitutional:       General: She is not in acute distress.     Appearance: She is well-developed.   HENT:      Head: Normocephalic and atraumatic.      Mouth/Throat:        Comments: Periapical abscess noted, TTP  Eyes:      Extraocular Movements: Extraocular movements intact.      Conjunctiva/sclera: Conjunctivae normal.   Cardiovascular:      Rate and Rhythm: Normal rate.   Pulmonary:      Effort: Pulmonary effort is normal. No respiratory distress.   Musculoskeletal:         General: No swelling.      Cervical back: Neck supple.   Skin:     General: Skin is warm and dry.      Capillary Refill: Capillary refill takes less than 2 seconds.   Neurological:      Mental Status: She is alert and oriented to person, place, and time.   Psychiatric:         Mood and Affect: Mood normal.         Vital Signs  ED Triage Vitals [09/06/24 2034]   Temperature Pulse Respirations Blood Pressure SpO2   98.4 °F (36.9 °C) 72 16 133/65 100 %      Temp Source Heart Rate Source Patient Position - Orthostatic VS BP Location FiO2 (%)   Oral Monitor -- -- --      Pain Score       6           Vitals:    09/06/24 2034   BP: 133/65   Pulse: 72         Visual Acuity      ED Medications  Medications    acetaminophen (TYLENOL) tablet 975 mg (975 mg Oral Given 9/6/24 2137)   ketorolac (TORADOL) injection 30 mg (30 mg Intramuscular Given 9/6/24 2139)   penicillin V potassium (VEETID) tablet 500 mg (500 mg Oral Given 9/6/24 2137)       Diagnostic Studies  Results Reviewed       None                   No orders to display              Procedures  Procedures         ED Course  ED Course as of 09/07/24 0336   Fri Sep 06, 2024   2122 Pt seen and examined. 30 yo female with L upper molar dental pain and abscess.  Had cavity filled 1 mo ago and a few days ago felt abscess above tooth and some discomfort - pain worsened today and now throbbing.  Will give IM toradol, Tylenol and treat with PCN and return to dentist.                                 SBIRT 20yo+      Flowsheet Row Most Recent Value   Initial Alcohol Screen: US AUDIT-C     1. How often do you have a drink containing alcohol? 2 Filed at: 09/06/2024 2034   2. How many drinks containing alcohol do you have on a typical day you are drinking?  2 Filed at: 09/06/2024 2034   3a. Male UNDER 65: How often do you have five or more drinks on one occasion? 0 Filed at: 09/06/2024 2034   3b. FEMALE Any Age, or MALE 65+: How often do you have 4 or more drinks on one occassion? 0 Filed at: 09/06/2024 2034   Audit-C Score 4 Filed at: 09/06/2024 2034   NICOLETTE: How many times in the past year have you...    Used an illegal drug or used a prescription medication for non-medical reasons? Never Filed at: 09/06/2024 2034                      Medical Decision Making  Risk  OTC drugs.  Prescription drug management.                 Disposition  Final diagnoses:   Dental abscess   Dental infection     Time reflects when diagnosis was documented in both MDM as applicable and the Disposition within this note       Time User Action Codes Description Comment    9/6/2024  9:54 PM Zuleima Burgos Add [K04.7] Dental abscess     9/6/2024  9:54 PM Zuleima Burgos Add [K04.7] Dental infection            ED Disposition       ED Disposition   Discharge    Condition   Stable    Date/Time   Fri Sep 6, 2024 2154    Comment   Clarice Arriazaantisommer discharge to home/self care.                   Follow-up Information       Follow up With Specialties Details Why Contact Info    your dentist  Schedule an appointment as soon as possible for a visit in 3 days              Discharge Medication List as of 9/6/2024  9:59 PM        START taking these medications    Details   acetaminophen (TYLENOL) 650 mg CR tablet Take 1 tablet (650 mg total) by mouth every 6 (six) hours as needed for mild pain, Starting Fri 9/6/2024, Normal      ibuprofen (MOTRIN) 800 mg tablet Take 1 tablet (800 mg total) by mouth every 8 (eight) hours as needed for mild pain or moderate pain for up to 10 days, Starting Fri 9/6/2024, Until Mon 9/16/2024 at 2359, Normal      penicillin V potassium (VEETID) 500 mg tablet Take 1 tablet (500 mg total) by mouth 4 (four) times a day for 7 days, Starting Fri 9/6/2024, Until Fri 9/13/2024, Normal           CONTINUE these medications which have NOT CHANGED    Details   dicyclomine (BENTYL) 20 mg tablet Take 1 tablet (20 mg total) by mouth 2 (two) times a day, Starting Tue 12/19/2023, Normal      ondansetron (ZOFRAN-ODT) 4 mg disintegrating tablet Take 1 tablet (4 mg total) by mouth every 6 (six) hours as needed for nausea or vomiting, Starting Tue 12/19/2023, Normal             No discharge procedures on file.    PDMP Review       None            ED Provider  Electronically Signed by             Zuleima Burgos DO  09/07/24 9715